# Patient Record
Sex: FEMALE | Race: BLACK OR AFRICAN AMERICAN | Employment: UNEMPLOYED | URBAN - METROPOLITAN AREA
[De-identification: names, ages, dates, MRNs, and addresses within clinical notes are randomized per-mention and may not be internally consistent; named-entity substitution may affect disease eponyms.]

---

## 2017-06-20 LAB
ANTIBODY SCREEN, EXTERNAL: NEGATIVE
CHLAMYDIA, EXTERNAL: NEGATIVE
GTT, 1 HR, GLUCOLA, EXTERNAL: 92
HBSAG, EXTERNAL: NEGATIVE
HCT, EXTERNAL: 34.8
HGB, EXTERNAL: 11.2
HIV, EXTERNAL: NEGATIVE
N. GONORRHEA, EXTERNAL: NEGATIVE
RPR, EXTERNAL: NON REACTIVE
RUBELLA, EXTERNAL: NORMAL
TYPE, ABO & RH, EXTERNAL: NORMAL

## 2017-08-01 ENCOUNTER — HOSPITAL ENCOUNTER (OUTPATIENT)
Age: 22
Setting detail: OBSERVATION
Discharge: HOME | End: 2017-08-02
Attending: OBSTETRICS & GYNECOLOGY | Admitting: OBSTETRICS & GYNECOLOGY
Payer: MEDICAID

## 2017-08-01 PROBLEM — Z34.90 PREGNANCY: Status: ACTIVE | Noted: 2017-08-01

## 2017-08-01 PROCEDURE — 99218 HC RM OBSERVATION: CPT

## 2017-08-01 PROCEDURE — 59025 FETAL NON-STRESS TEST: CPT | Performed by: OBSTETRICS & GYNECOLOGY

## 2017-08-01 PROCEDURE — 99285 EMERGENCY DEPT VISIT HI MDM: CPT | Performed by: OBSTETRICS & GYNECOLOGY

## 2017-08-01 PROCEDURE — 74011250636 HC RX REV CODE- 250/636: Performed by: OBSTETRICS & GYNECOLOGY

## 2017-08-01 PROCEDURE — 75810000275 HC EMERGENCY DEPT VISIT NO LEVEL OF CARE

## 2017-08-01 PROCEDURE — 96360 HYDRATION IV INFUSION INIT: CPT | Performed by: OBSTETRICS & GYNECOLOGY

## 2017-08-01 PROCEDURE — 74011250637 HC RX REV CODE- 250/637: Performed by: OBSTETRICS & GYNECOLOGY

## 2017-08-01 RX ORDER — SODIUM CHLORIDE, SODIUM LACTATE, POTASSIUM CHLORIDE, CALCIUM CHLORIDE 600; 310; 30; 20 MG/100ML; MG/100ML; MG/100ML; MG/100ML
500 INJECTION, SOLUTION INTRAVENOUS CONTINUOUS
Status: DISCONTINUED | OUTPATIENT
Start: 2017-08-01 | End: 2017-08-02 | Stop reason: HOSPADM

## 2017-08-01 RX ORDER — SODIUM CHLORIDE, SODIUM LACTATE, POTASSIUM CHLORIDE, CALCIUM CHLORIDE 600; 310; 30; 20 MG/100ML; MG/100ML; MG/100ML; MG/100ML
150 INJECTION, SOLUTION INTRAVENOUS CONTINUOUS
Status: DISCONTINUED | OUTPATIENT
Start: 2017-08-01 | End: 2017-08-02 | Stop reason: HOSPADM

## 2017-08-01 RX ORDER — ZOLPIDEM TARTRATE 5 MG/1
5 TABLET ORAL
Status: DISCONTINUED | OUTPATIENT
Start: 2017-08-01 | End: 2017-08-02 | Stop reason: HOSPADM

## 2017-08-01 RX ADMIN — SODIUM CHLORIDE, SODIUM LACTATE, POTASSIUM CHLORIDE, AND CALCIUM CHLORIDE 500 ML: 600; 310; 30; 20 INJECTION, SOLUTION INTRAVENOUS at 21:09

## 2017-08-01 RX ADMIN — ZOLPIDEM TARTRATE 5 MG: 5 TABLET ORAL at 23:34

## 2017-08-01 NOTE — IP AVS SNAPSHOT
Summary of Care Report The Summary of Care report has been created to help improve care coordination. Users with access to [employer] or [address] (Web-based application) may access additional patient information including the Discharge Summary. If you are not currently a [address] user and need more information, please call the number listed below in the [place] [address] section and ask to be connected with Medical Records. Facility Information Name Address Phone [address] [place] [id number] [phone] Patient Information Patient Name Sex  Verl Credit ([id number]) Female 1995 Discharge Information Admitting Provider Service Area Unit Shirin Patel MD / [phone] [address] [facility] 2 Labor & Delivery / [phone] Discharge Provider Discharge Date/Time Discharge Disposition Destination (none) 2017 (Pending) AHR (none) Patient Language Language ENGLISH [13] Hospital Problems as of 2017  Never Reviewed Class Noted - Resolved Last Modified POA Active Problems Pregnancy  2017 - Present 2017 by Shirin Patel MD Unknown Entered by Shirin Patel MD  
  
You are allergic to the following No active allergies Current Discharge Medication List  
  
CONTINUE these medications which have NOT CHANGED Dose & Instructions Dispensing Information Comments PRENATAL PO Dose:  1 Tab Take 1 Tab by mouth daily. Refills:  0 Follow-up Information Follow up With Details Comments Contact Info Shirin Patel MD Go to Regularly scheduled appointment [address] [facility] 150 1007 [facility] 
[phone] Discharge Instructions Jessica Escobedo Contractions: Care Instructions Your Care Instructions Yalaha Santana contractions prepare your uterus for labor. Think of them as a \"warm-up\" exercise that your body does. You may begin to feel them between the 28th and 30th weeks of your pregnancy. But they start as early as the 20th week. Yalaha Santana contractions usually occur more often during the ninth month. They may go away when you are active and return when you rest. These contractions are like mild contractions of true labor, but they occur less often. (You feel fewer than 8 in an hour.) They don't cause your cervix to open. It may be hard for you to tell the difference between [facility] contractions and true labor, especially in your first pregnancy. Follow-up care is a key part of your treatment and safety. Be sure to make and go to all appointments, and call your doctor if you are having problems. It's also a good idea to know your test results and keep a list of the medicines you take. How can you care for yourself at home? · Try a warm bath to help relieve muscle tension and reduce pain. · Change positions every 30 minutes. Take breaks if you must sit for a long time. Get up and walk around. · Drink plenty of water, enough so that your urine is light yellow or clear like water. · Taking short walks may help you feel better. Your doctor needs to check any contractions that are getting stronger or closer together. Where can you learn more? Go to http://dangelo-rad.info/. Enter [id number] in the search box to learn more about \"Yalaha Santana Contractions: Care Instructions. \" Current as of: March 16, 2017 Content Version: 11.3 © 1432-2616 [employer]. Care instructions adapted under license by [employer] (which disclaims liability or warranty for this information).  If you have questions about a medical condition or this instruction, always ask your healthcare professional. [employer] disclaims any warranty or liability for your use of this information. Pregnancy Precautions: Care Instructions Your Care Instructions There is no sure way to prevent labor before your due date ( labor) or to prevent most other pregnancy problems. But there are things you can do to increase your chances of a healthy pregnancy. Go to your appointments, follow your doctor's advice, and take good care of yourself. Eat well, and exercise (if your doctor agrees). And make sure to drink plenty of water. Follow-up care is a key part of your treatment and safety. Be sure to make and go to all appointments, and call your doctor if you are having problems. It's also a good idea to know your test results and keep a list of the medicines you take. How can you care for yourself at home? · Make sure you go to your prenatal appointments. At each visit, your doctor will check your blood pressure. Your doctor will also check to see if you have protein in your urine. High blood pressure and protein in urine are signs of preeclampsia. This condition can be dangerous for you and your baby. · Drink plenty of fluids, enough so that your urine is light yellow or clear like water. Dehydration can cause contractions. If you have kidney, heart, or liver disease and have to limit fluids, talk with your doctor before you increase the amount of fluids you drink. · Tell your doctor right away if you notice any symptoms of an infection, such as: ¨ Burning when you urinate. ¨ A foul-smelling discharge from your vagina. ¨ Vaginal itching. ¨ Unexplained fever. ¨ Unusual pain or soreness in your uterus or lower belly. · Eat a balanced diet. Include plenty of foods that are high in calcium and iron. ¨ Foods high in calcium include milk, cheese, yogurt, almonds, and broccoli. ¨ Foods high in iron include red meat, shellfish, poultry, eggs, beans, raisins, whole-grain bread, and leafy green vegetables. · Do not smoke. If you need help quitting, talk to your doctor about stop-smoking programs and medicines. These can increase your chances of quitting for good. · Do not drink alcohol or use illegal drugs. · Follow your doctor's directions about activity. Your doctor will let you know how much, if any, exercise you can do. · Ask your doctor if you can have sex. If you are at risk for early labor, your doctor may ask you to not have sex. · Take care to prevent falls. During pregnancy, your joints are loose, and your balance is off. Sports such as bicycling, skiing, or in-line skating can increase your risk of falling. And don't ride horses or motorcycles, dive, water ski, scuba dive, or parachute jump while you are pregnant. · Avoid getting very hot. Do not use saunas or hot tubs. Avoid staying out in the sun in hot weather for long periods. Take acetaminophen (Tylenol) to lower a high fever. · Do not take any over-the-counter or herbal medicines or supplements without talking to your doctor or pharmacist first. 
When should you call for help? Call 911 anytime you think you may need emergency care. For example, call if: 
· You passed out (lost consciousness). · You have severe vaginal bleeding. · You have severe pain in your belly or pelvis. · You have had fluid gushing or leaking from your vagina and you know or think the umbilical cord is bulging into your vagina. If this happens, immediately get down on your knees so your rear end (buttocks) is higher than your head. This will decrease the pressure on the cord until help arrives. Call your doctor now or seek immediate medical care if: 
· You have signs of preeclampsia, such as: 
¨ Sudden swelling of your face, hands, or feet. ¨ New vision problems (such as dimness or blurring). ¨ A severe headache. · You have any vaginal bleeding. · You have belly pain or cramping. · You have a fever. · You have had regular contractions (with or without pain) for an hour. This means that you have 8 or more within 1 hour or 4 or more in 20 minutes after you change your position and drink fluids. · You have a sudden release of fluid from your vagina. · You have low back pain or pelvic pressure that does not go away. · You notice that your baby has stopped moving or is moving much less than normal. 
Watch closely for changes in your health, and be sure to contact your doctor if you have any problems. Where can you learn more? Go to http://dangelo-rad.info/. Enter [id number] in the search box to learn more about \"Pregnancy Precautions: Care Instructions. \" Current as of: March 16, 2017 Content Version: 11.3 © 9387-1197 [employer]. Care instructions adapted under license by [employer] (which disclaims liability or warranty for this information). If you have questions about a medical condition or this instruction, always ask your healthcare professional. [place] [address] any warranty or liability for your use of this information. Weeks 34 to 36 of Your Pregnancy: Care Instructions Your Care Instructions By now, your baby and your belly have grown quite large. It is almost time to give birth. A full-term pregnancy can deliver between 37 and 42 weeks. Your baby's lungs are almost ready to breathe air. The bones in your baby's head are now firm enough to protect it, but soft enough to move down through the birth canal. 
You may feel excited, happy, anxious, or scared. You may wonder how you will know if you are in labor or what to expect during labor. Try to be flexible in your expectations of the birth. Because each birth is different, there is no way to know exactly what childbirth will be like for you. This care sheet will help you know what to expect and how to prepare. This may make your childbirth easier. If you haven't already had the Tdap shot during this pregnancy, talk to your doctor about getting it. It will help protect your  against pertussis infection. In the 36th week, most women have a test for group B streptococcus (GBS). GBS is a common bacteria that can live in the vagina and rectum. It can make your baby sick after birth. If you test positive, you will get antibiotics during labor. The medicine will keep your baby from getting the bacteria. Follow-up care is a key part of your treatment and safety. Be sure to make and go to all appointments, and call your doctor if you are having problems. It's also a good idea to know your test results and keep a list of the medicines you take. How can you care for yourself at home? Learn about pain relief choices · Pain is different for every woman. Talk with your doctor about your feelings about pain. · You can choose from several types of pain relief. These include medicine or breathing techniques, as well as comfort measures. You can use more than one option. · If you choose to have pain medicine during labor, talk to your doctor about your options. Some medicines lower anxiety and help with some of the pain. Others make your lower body numb so that you won't feel pain. · Be sure to tell your doctor about your pain medicine choice before you start labor or very early in your labor. You may be able to change your mind as labor progresses. · Rarely, a woman is put to sleep by medicine given through a mask or an IV. Labor and delivery · The first stage of labor has three parts: early, active, and transition. ¨ Most women have early labor at home. You can stay busy or rest, eat light snacks, drink clear fluids, and start counting contractions. ¨ When talking during a contraction gets hard, you may be moving to active labor. During active labor, you should head for the hospital if you are not there already. ¨ You are in active labor when contractions come every 3 to 4 minutes and last about 60 seconds. Your cervix is opening more rapidly. ¨ If your water breaks, contractions will come faster and stronger. ¨ During transition, your cervix is stretching, and contractions are coming more rapidly. ¨ You may want to push, but your cervix might not be ready. Your doctor will tell you when to push. · The second stage starts when your cervix is completely opened and you are ready to push. ¨ Contractions are very strong to push the baby down the birth canal. 
¨ You will feel the urge to push. You may feel like you need to have a bowel movement. ¨ You may be coached to push with contractions. These contractions will be very strong, but you will not have them as often. You can get a little rest between contractions. ¨ You may be emotional and irritable. You may not be aware of what is going on around you. ¨ One last push, and your baby is born. · The third stage is when a few more contractions push out the placenta. This may take 30 minutes or less. · The fourth stage is the welcome recovery. You may feel overwhelmed with emotions and exhausted but alert. This is a good time to start breastfeeding. Where can you learn more? Go to http://dangelo-rad.info/. Enter U196 in the search box to learn more about \"Weeks 34 to 36 of Your Pregnancy: Care Instructions. \" Current as of: March 16, 2017 Content Version: 11.3 © 5973-3004 [employer]. Care instructions adapted under license by [employer] (which disclaims liability or warranty for this information). If you have questions about a medical condition or this instruction, always ask your healthcare professional. [address] any warranty or liability for your use of this information. Counting Your Baby's Kicks: Care Instructions Your Care Instructions Counting your baby's kicks is one way your doctor can tell that your baby is healthy. Most womenespecially in a first pregnancyfeel their baby move for the first time between 16 and 22 weeks. The movement may feel like flutters rather than kicks. Your baby may move more at certain times of the day. When you are active, you may notice less kicking than when you are resting. At your prenatal visits, your doctor will ask whether the baby is active. In your last trimester, your doctor may ask you to count the number of times you feel your baby move. Follow-up care is a key part of your treatment and safety. Be sure to make and go to all appointments, and call your doctor if you are having problems. It's also a good idea to know your test results and keep a list of the medicines you take. How do you count fetal kicks? · A common method of checking your baby's movement is to count the number of kicks or moves you feel in 1 hour. Ten movements (such as kicks, flutters, or rolls) in 1 hour are normal. Some doctors suggest that you count in the morning until you get to 10 movements. Then you can quit for that day and start again the next day. · Pick your baby's most active time of day to count. This may be any time from morning to evening. · If you do not feel 10 movements in an hour, your baby may be sleeping. Wait for the next hour and count again. When should you call for help? Call your doctor now or seek immediate medical care if: 
· You noticed that your baby has stopped moving or is moving much less than normal. 
Watch closely for changes in your health, and be sure to contact your doctor if you have any problems. Where can you learn more? Go to http://dangelo-rad.info/. Enter M456 in the search box to learn more about \"Counting Your Baby's Kicks: Care Instructions. \" Current as of: March 16, 2017 Content Version: 11.3 © 3023-9015 [employer]. Care instructions adapted under license by [email or web address] (which disclaims liability or warranty for this information). If you have questions about a medical condition or this instruction, always ask your healthcare professional. [place] [address] any warranty or liability for your use of this information. Chart Review Routing History No Routing History on File

## 2017-08-02 VITALS
RESPIRATION RATE: 14 BRPM | HEART RATE: 85 BPM | HEIGHT: 68 IN | DIASTOLIC BLOOD PRESSURE: 56 MMHG | SYSTOLIC BLOOD PRESSURE: 117 MMHG | BODY MASS INDEX: 31.22 KG/M2 | WEIGHT: 206 LBS | TEMPERATURE: 97.9 F

## 2017-08-02 PROCEDURE — 99218 HC RM OBSERVATION: CPT

## 2017-08-02 NOTE — PROGRESS NOTES
Pt arrived to L&D with complaint of contractions. Pt rates contractions 3/10 on pain scale. Pt states she had sexual intercourse this evening and had approximately 4 cups of water today. 2045 Spoke to Dr. Valentino Cota, updated on pt status, SVE, contractions, and that pt had sex and has not had very much water to drink today. Per MD, will give 500mL bolus and recheck in a few hours if contractions persist and update MD. If contractions stop no need to recheck per MD.   2300 Dr. Valentino Cota updated on pt. Will keep pt overnight. May have ambien if she wants. Pt to stay on monitor. Bedside shift change report given to [facility], RN (oncoming nurse) by NATHANIEL Vieira RN (offgoing nurse). Report included the following information SBAR, Kardex, Intake/Output, MAR, Recent Results and Med Rec Status.

## 2017-08-02 NOTE — DISCHARGE INSTRUCTIONS
Boris Noun Contractions: Care Instructions  Your Care Instructions  Panda Santana contractions prepare your uterus for labor. Think of them as a \"warm-up\" exercise that your body does. You may begin to feel them between the 28th and 30th weeks of your pregnancy. But they start as early as the 20th week. [place] Santana contractions usually occur more often during the ninth month. They may go away when you are active and return when you rest. These contractions are like mild contractions of true labor, but they occur less often. (You feel fewer than 8 in an hour.) They don't cause your cervix to open. It may be hard for you to tell the difference between Boris Noun contractions and true labor, especially in your first pregnancy. Follow-up care is a key part of your treatment and safety. Be sure to make and go to all appointments, and call your doctor if you are having problems. It's also a good idea to know your test results and keep a list of the medicines you take. How can you care for yourself at home? · Try a warm bath to help relieve muscle tension and reduce pain. · Change positions every 30 minutes. Take breaks if you must sit for a long time. Get up and walk around. · Drink plenty of water, enough so that your urine is light yellow or clear like water. · Taking short walks may help you feel better. Your doctor needs to check any contractions that are getting stronger or closer together. Where can you learn more? Go to http://dangelo-rad.info/. Enter [id number] in the search box to learn more about \"[place] Santana Contractions: Care Instructions. \"  Current as of: March 16, 2017  Content Version: 11.3  © 9426-2843 [employer]. Care instructions adapted under license by [employer] (which disclaims liability or warranty for this information).  If you have questions about a medical condition or this instruction, always ask your healthcare professional. [employer], [facility] disclaims any warranty or liability for your use of this information. Pregnancy Precautions: Care Instructions  Your Care Instructions  There is no sure way to prevent labor before your due date ( labor) or to prevent most other pregnancy problems. But there are things you can do to increase your chances of a healthy pregnancy. Go to your appointments, follow your doctor's advice, and take good care of yourself. Eat well, and exercise (if your doctor agrees). And make sure to drink plenty of water. Follow-up care is a key part of your treatment and safety. Be sure to make and go to all appointments, and call your doctor if you are having problems. It's also a good idea to know your test results and keep a list of the medicines you take. How can you care for yourself at home? · Make sure you go to your prenatal appointments. At each visit, your doctor will check your blood pressure. Your doctor will also check to see if you have protein in your urine. High blood pressure and protein in urine are signs of preeclampsia. This condition can be dangerous for you and your baby. · Drink plenty of fluids, enough so that your urine is light yellow or clear like water. Dehydration can cause contractions. If you have kidney, heart, or liver disease and have to limit fluids, talk with your doctor before you increase the amount of fluids you drink. · Tell your doctor right away if you notice any symptoms of an infection, such as:  ¨ Burning when you urinate. ¨ A foul-smelling discharge from your vagina. ¨ Vaginal itching. ¨ Unexplained fever. ¨ Unusual pain or soreness in your uterus or lower belly. · Eat a balanced diet. Include plenty of foods that are high in calcium and iron. ¨ Foods high in calcium include milk, cheese, yogurt, almonds, and broccoli.   ¨ Foods high in iron include red meat, shellfish, poultry, eggs, beans, raisins, whole-grain bread, and leafy green vegetables. · Do not smoke. If you need help quitting, talk to your doctor about stop-smoking programs and medicines. These can increase your chances of quitting for good. · Do not drink alcohol or use illegal drugs. · Follow your doctor's directions about activity. Your doctor will let you know how much, if any, exercise you can do. · Ask your doctor if you can have sex. If you are at risk for early labor, your doctor may ask you to not have sex. · Take care to prevent falls. During pregnancy, your joints are loose, and your balance is off. Sports such as bicycling, skiing, or in-line skating can increase your risk of falling. And don't ride horses or motorcycles, dive, water ski, scuba dive, or parachute jump while you are pregnant. · Avoid getting very hot. Do not use saunas or hot tubs. Avoid staying out in the sun in hot weather for long periods. Take acetaminophen (Tylenol) to lower a high fever. · Do not take any over-the-counter or herbal medicines or supplements without talking to your doctor or pharmacist first.  When should you call for help? Call 911 anytime you think you may need emergency care. For example, call if:  · You passed out (lost consciousness). · You have severe vaginal bleeding. · You have severe pain in your belly or pelvis. · You have had fluid gushing or leaking from your vagina and you know or think the umbilical cord is bulging into your vagina. If this happens, immediately get down on your knees so your rear end (buttocks) is higher than your head. This will decrease the pressure on the cord until help arrives. Call your doctor now or seek immediate medical care if:  · You have signs of preeclampsia, such as:  ¨ Sudden swelling of your face, hands, or feet. ¨ New vision problems (such as dimness or blurring). ¨ A severe headache. · You have any vaginal bleeding. · You have belly pain or cramping. · You have a fever.   · You have had regular contractions (with or without pain) for an hour. This means that you have 8 or more within 1 hour or 4 or more in 20 minutes after you change your position and drink fluids. · You have a sudden release of fluid from your vagina. · You have low back pain or pelvic pressure that does not go away. · You notice that your baby has stopped moving or is moving much less than normal.  Watch closely for changes in your health, and be sure to contact your doctor if you have any problems. Where can you learn more? Go to http://dangelo-rad.info/. Enter [id number] in the search box to learn more about \"Pregnancy Precautions: Care Instructions. \"  Current as of: March 16, 2017  Content Version: 11.3  © 0025-9843 [employer]. Care instructions adapted under license by [employer] (which disclaims liability or warranty for this information). If you have questions about a medical condition or this instruction, always ask your healthcare professional. [place] [address] any warranty or liability for your use of this information. Weeks 34 to 36 of Your Pregnancy: Care Instructions  Your Care Instructions    By now, your baby and your belly have grown quite large. It is almost time to give birth. A full-term pregnancy can deliver between 37 and 42 weeks. Your baby's lungs are almost ready to breathe air. The bones in your baby's head are now firm enough to protect it, but soft enough to move down through the birth canal.  You may feel excited, happy, anxious, or scared. You may wonder how you will know if you are in labor or what to expect during labor. Try to be flexible in your expectations of the birth. Because each birth is different, there is no way to know exactly what childbirth will be like for you. This care sheet will help you know what to expect and how to prepare. This may make your childbirth easier.   If you haven't already had the Tdap shot during this pregnancy, talk to your doctor about getting it. It will help protect your  against pertussis infection. In the 36th week, most women have a test for group B streptococcus (GBS). GBS is a common bacteria that can live in the vagina and rectum. It can make your baby sick after birth. If you test positive, you will get antibiotics during labor. The medicine will keep your baby from getting the bacteria. Follow-up care is a key part of your treatment and safety. Be sure to make and go to all appointments, and call your doctor if you are having problems. It's also a good idea to know your test results and keep a list of the medicines you take. How can you care for yourself at home? Learn about pain relief choices  · Pain is different for every woman. Talk with your doctor about your feelings about pain. · You can choose from several types of pain relief. These include medicine or breathing techniques, as well as comfort measures. You can use more than one option. · If you choose to have pain medicine during labor, talk to your doctor about your options. Some medicines lower anxiety and help with some of the pain. Others make your lower body numb so that you won't feel pain. · Be sure to tell your doctor about your pain medicine choice before you start labor or very early in your labor. You may be able to change your mind as labor progresses. · Rarely, a woman is put to sleep by medicine given through a mask or an IV. Labor and delivery  · The first stage of labor has three parts: early, active, and transition. ¨ Most women have early labor at home. You can stay busy or rest, eat light snacks, drink clear fluids, and start counting contractions. ¨ When talking during a contraction gets hard, you may be moving to active labor. During active labor, you should head for the hospital if you are not there already. ¨ You are in active labor when contractions come every 3 to 4 minutes and last about 60 seconds.  Your cervix is opening more rapidly. ¨ If your water breaks, contractions will come faster and stronger. ¨ During transition, your cervix is stretching, and contractions are coming more rapidly. ¨ You may want to push, but your cervix might not be ready. Your doctor will tell you when to push. · The second stage starts when your cervix is completely opened and you are ready to push. ¨ Contractions are very strong to push the baby down the birth canal.  ¨ You will feel the urge to push. You may feel like you need to have a bowel movement. ¨ You may be coached to push with contractions. These contractions will be very strong, but you will not have them as often. You can get a little rest between contractions. ¨ You may be emotional and irritable. You may not be aware of what is going on around you. ¨ One last push, and your baby is born. · The third stage is when a few more contractions push out the placenta. This may take 30 minutes or less. · The fourth stage is the welcome recovery. You may feel overwhelmed with emotions and exhausted but alert. This is a good time to start breastfeeding. Where can you learn more? Go to http://dangelo-rad.info/. Enter X648 in the search box to learn more about \"Weeks 34 to 36 of Your Pregnancy: Care Instructions. \"  Current as of: March 16, 2017  Content Version: 11.3  © 3339-0617 [employer]. Care instructions adapted under license by [employer] (which disclaims liability or warranty for this information). If you have questions about a medical condition or this instruction, always ask your healthcare professional. [place] [address] any warranty or liability for your use of this information. Counting Your Baby's Kicks: Care Instructions  Your Care Instructions  Counting your baby's kicks is one way your doctor can tell that your baby is healthy.  Most women--especially in a first pregnancy--feel their baby move for the first time between 16 and 22 weeks. The movement may feel like flutters rather than kicks. Your baby may move more at certain times of the day. When you are active, you may notice less kicking than when you are resting. At your prenatal visits, your doctor will ask whether the baby is active. In your last trimester, your doctor may ask you to count the number of times you feel your baby move. Follow-up care is a key part of your treatment and safety. Be sure to make and go to all appointments, and call your doctor if you are having problems. It's also a good idea to know your test results and keep a list of the medicines you take. How do you count fetal kicks? · A common method of checking your baby's movement is to count the number of kicks or moves you feel in 1 hour. Ten movements (such as kicks, flutters, or rolls) in 1 hour are normal. Some doctors suggest that you count in the morning until you get to 10 movements. Then you can quit for that day and start again the next day. · Pick your baby's most active time of day to count. This may be any time from morning to evening. · If you do not feel 10 movements in an hour, your baby may be sleeping. Wait for the next hour and count again. When should you call for help? Call your doctor now or seek immediate medical care if:  · You noticed that your baby has stopped moving or is moving much less than normal.  Watch closely for changes in your health, and be sure to contact your doctor if you have any problems. Where can you learn more? Go to http://dangelo-rad.info/. Enter R378 in the search box to learn more about \"Counting Your Baby's Kicks: Care Instructions. \"  Current as of: March 16, 2017  Content Version: 11.3  © 4465-6384 [employer]. Care instructions adapted under license by [email or web address] (which disclaims liability or warranty for this information).  If you have questions about a medical condition or this instruction, always ask your healthcare professional. [place] [address] any warranty or liability for your use of this information.

## 2017-08-02 NOTE — PROGRESS NOTES
26 - Assumed care of patient at this time. Patient sleeping.     2118 - Patient sleeping.     TravisFarzanehBrant - Dr. Zulema Mancuso at bedside assessing patient. SVE = unchanged from previous exam. Verbal orders to discharge patient this morning. Dr. Zulema Mancuso reviewed FHR tracing at this time, can DC EFM. VSS. Patient denies contractions. 0820 - PIV removed. Tip intact. I have reviewed discharge instructions with the patient including week 35-36 pregnancy, kick counts, pregnancy precautions, follow-up instructions and yanet carnes contractions. The patient verbalized understanding and questioned true labor vs yanet carnes, and kick counts. Educated on both subjects and importance of staying hydrated on hot summer days. Patient had no further questions at this time. 9194 - Patient ambulated off unit at this time with discharge instructions and personal belongings.

## 2017-08-02 NOTE — PROGRESS NOTES
25 yo A1 with IUP at 35 3/7 weeks presented last night with frequent cramping after intercourse yesterday afternoon. No bleeding or LOF. Reported good FM as well. On arrival to L&D she was noted to be sangeeta frequently but not much discomfort. Her cervix was 1/50%. After monitoring on L&D UCs continued and there was thought to be some minimal cervical change. She was then monitored overnight. This morning she is without complaints. Not feeling any UCs. +FM. No VB, LOF    Exam shows VSS AF  Cervix 1/50/-3  EFM shows reactive tracing. No UCs noted at present    IMP: Threatened  labor  Plan: discharge to home, f/u as scheduled. Pelvic rest for now.

## 2017-08-10 LAB — GRBS, EXTERNAL: NEGATIVE

## 2017-09-05 ENCOUNTER — ANESTHESIA EVENT (OUTPATIENT)
Dept: LABOR AND DELIVERY | Age: 22
DRG: 560 | End: 2017-09-05
Payer: MEDICAID

## 2017-09-05 ENCOUNTER — HOSPITAL ENCOUNTER (INPATIENT)
Age: 22
LOS: 2 days | Discharge: HOME | DRG: 560 | End: 2017-09-07
Attending: OBSTETRICS & GYNECOLOGY | Admitting: OBSTETRICS & GYNECOLOGY
Payer: MEDICAID

## 2017-09-05 ENCOUNTER — ANESTHESIA (OUTPATIENT)
Dept: LABOR AND DELIVERY | Age: 22
DRG: 560 | End: 2017-09-05
Payer: MEDICAID

## 2017-09-05 LAB
ERYTHROCYTE [DISTWIDTH] IN BLOOD BY AUTOMATED COUNT: 14.6 % (ref 11.5–14.5)
HCT VFR BLD AUTO: 35.8 % (ref 35–47)
HGB BLD-MCNC: 11 G/DL (ref 11.5–16)
MCH RBC QN AUTO: 25.2 PG (ref 26–34)
MCHC RBC AUTO-ENTMCNC: 30.7 G/DL (ref 30–36.5)
MCV RBC AUTO: 81.9 FL (ref 80–99)
PLATELET # BLD AUTO: 255 K/UL (ref 150–400)
RBC # BLD AUTO: 4.37 M/UL (ref 3.8–5.2)
WBC # BLD AUTO: 10 K/UL (ref 3.6–11)

## 2017-09-05 PROCEDURE — 74011000250 HC RX REV CODE- 250

## 2017-09-05 PROCEDURE — 74011250636 HC RX REV CODE- 250/636: Performed by: OBSTETRICS & GYNECOLOGY

## 2017-09-05 PROCEDURE — 65270000029 HC RM PRIVATE

## 2017-09-05 PROCEDURE — 74011250637 HC RX REV CODE- 250/637: Performed by: OBSTETRICS & GYNECOLOGY

## 2017-09-05 PROCEDURE — 77030014125 HC TY EPDRL BBMI -B: Performed by: ANESTHESIOLOGY

## 2017-09-05 PROCEDURE — 74011250636 HC RX REV CODE- 250/636: Performed by: ANESTHESIOLOGY

## 2017-09-05 PROCEDURE — 76060000078 HC EPIDURAL ANESTHESIA: Performed by: ANESTHESIOLOGY

## 2017-09-05 PROCEDURE — 74011250636 HC RX REV CODE- 250/636

## 2017-09-05 PROCEDURE — 77030034850

## 2017-09-05 PROCEDURE — 75410000000 HC DELIVERY VAGINAL/SINGLE: Performed by: OBSTETRICS & GYNECOLOGY

## 2017-09-05 PROCEDURE — 3E033VJ INTRODUCTION OF OTHER HORMONE INTO PERIPHERAL VEIN, PERCUTANEOUS APPROACH: ICD-10-PCS | Performed by: OBSTETRICS & GYNECOLOGY

## 2017-09-05 PROCEDURE — 77030018749 HC HK AMNIO DISP DERY -A

## 2017-09-05 PROCEDURE — 75410000002 HC LABOR FEE PER 1 HR: Performed by: OBSTETRICS & GYNECOLOGY

## 2017-09-05 PROCEDURE — 75410000003 HC RECOV DEL/VAG/CSECN EA 0.5 HR: Performed by: OBSTETRICS & GYNECOLOGY

## 2017-09-05 PROCEDURE — 36415 COLL VENOUS BLD VENIPUNCTURE: CPT | Performed by: OBSTETRICS & GYNECOLOGY

## 2017-09-05 PROCEDURE — 85027 COMPLETE CBC AUTOMATED: CPT | Performed by: OBSTETRICS & GYNECOLOGY

## 2017-09-05 PROCEDURE — 00HU33Z INSERTION OF INFUSION DEVICE INTO SPINAL CANAL, PERCUTANEOUS APPROACH: ICD-10-PCS | Performed by: ANESTHESIOLOGY

## 2017-09-05 RX ORDER — HYDROCODONE BITARTRATE AND ACETAMINOPHEN 5; 325 MG/1; MG/1
1 TABLET ORAL
Status: DISCONTINUED | OUTPATIENT
Start: 2017-09-05 | End: 2017-09-07 | Stop reason: HOSPADM

## 2017-09-05 RX ORDER — HYDROCODONE BITARTRATE AND ACETAMINOPHEN 5; 325 MG/1; MG/1
2 TABLET ORAL
Status: DISCONTINUED | OUTPATIENT
Start: 2017-09-05 | End: 2017-09-07 | Stop reason: HOSPADM

## 2017-09-05 RX ORDER — ONDANSETRON 4 MG/1
4 TABLET, ORALLY DISINTEGRATING ORAL
Status: DISCONTINUED | OUTPATIENT
Start: 2017-09-05 | End: 2017-09-07 | Stop reason: HOSPADM

## 2017-09-05 RX ORDER — SODIUM CHLORIDE, SODIUM LACTATE, POTASSIUM CHLORIDE, CALCIUM CHLORIDE 600; 310; 30; 20 MG/100ML; MG/100ML; MG/100ML; MG/100ML
125 INJECTION, SOLUTION INTRAVENOUS CONTINUOUS
Status: DISCONTINUED | OUTPATIENT
Start: 2017-09-05 | End: 2017-09-07 | Stop reason: HOSPADM

## 2017-09-05 RX ORDER — IBUPROFEN 800 MG/1
800 TABLET ORAL
Status: DISCONTINUED | OUTPATIENT
Start: 2017-09-05 | End: 2017-09-07 | Stop reason: HOSPADM

## 2017-09-05 RX ORDER — SODIUM CHLORIDE 0.9 % (FLUSH) 0.9 %
5-10 SYRINGE (ML) INJECTION AS NEEDED
Status: DISCONTINUED | OUTPATIENT
Start: 2017-09-05 | End: 2017-09-07 | Stop reason: HOSPADM

## 2017-09-05 RX ORDER — FENTANYL/BUPIVACAINE/NS/PF 2-1250MCG
1-16 PREFILLED PUMP RESERVOIR EPIDURAL CONTINUOUS
Status: DISCONTINUED | OUTPATIENT
Start: 2017-09-05 | End: 2017-09-07 | Stop reason: HOSPADM

## 2017-09-05 RX ORDER — OXYTOCIN/RINGER'S LACTATE 20/1000 ML
125-500 PLASTIC BAG, INJECTION (ML) INTRAVENOUS ONCE
Status: ACTIVE | OUTPATIENT
Start: 2017-09-05 | End: 2017-09-06

## 2017-09-05 RX ORDER — HYDROCORTISONE ACETATE PRAMOXINE HCL 2.5; 1 G/100G; G/100G
CREAM TOPICAL AS NEEDED
Status: DISCONTINUED | OUTPATIENT
Start: 2017-09-05 | End: 2017-09-07 | Stop reason: HOSPADM

## 2017-09-05 RX ORDER — ACETAMINOPHEN 325 MG/1
650 TABLET ORAL
Status: DISCONTINUED | OUTPATIENT
Start: 2017-09-05 | End: 2017-09-07 | Stop reason: HOSPADM

## 2017-09-05 RX ORDER — BUPIVACAINE HYDROCHLORIDE 2.5 MG/ML
INJECTION, SOLUTION EPIDURAL; INFILTRATION; INTRACAUDAL AS NEEDED
Status: DISCONTINUED | OUTPATIENT
Start: 2017-09-05 | End: 2017-09-05 | Stop reason: HOSPADM

## 2017-09-05 RX ORDER — OXYTOCIN IN 5 % DEXTROSE 30/500 ML
PLASTIC BAG, INJECTION (ML) INTRAVENOUS
Status: COMPLETED
Start: 2017-09-05 | End: 2017-09-05

## 2017-09-05 RX ORDER — DIPHENHYDRAMINE HCL 25 MG
25 CAPSULE ORAL
Status: DISCONTINUED | OUTPATIENT
Start: 2017-09-05 | End: 2017-09-07 | Stop reason: HOSPADM

## 2017-09-05 RX ORDER — MINERAL OIL
120 OIL (ML) ORAL ONCE
Status: COMPLETED | OUTPATIENT
Start: 2017-09-05 | End: 2017-09-05

## 2017-09-05 RX ORDER — OXYTOCIN IN 5 % DEXTROSE 30/500 ML
.5-42 PLASTIC BAG, INJECTION (ML) INTRAVENOUS
Status: DISCONTINUED | OUTPATIENT
Start: 2017-09-05 | End: 2017-09-07 | Stop reason: HOSPADM

## 2017-09-05 RX ORDER — NALOXONE HYDROCHLORIDE 0.4 MG/ML
0.4 INJECTION, SOLUTION INTRAMUSCULAR; INTRAVENOUS; SUBCUTANEOUS ONCE
Status: ACTIVE | OUTPATIENT
Start: 2017-09-05 | End: 2017-09-06

## 2017-09-05 RX ORDER — NALOXONE HYDROCHLORIDE 0.4 MG/ML
0.4 INJECTION, SOLUTION INTRAMUSCULAR; INTRAVENOUS; SUBCUTANEOUS AS NEEDED
Status: DISCONTINUED | OUTPATIENT
Start: 2017-09-05 | End: 2017-09-07 | Stop reason: HOSPADM

## 2017-09-05 RX ORDER — NALOXONE HYDROCHLORIDE 0.4 MG/ML
0.4 INJECTION, SOLUTION INTRAMUSCULAR; INTRAVENOUS; SUBCUTANEOUS AS NEEDED
Status: DISCONTINUED | OUTPATIENT
Start: 2017-09-05 | End: 2017-09-05 | Stop reason: SDUPTHER

## 2017-09-05 RX ORDER — SODIUM CHLORIDE 0.9 % (FLUSH) 0.9 %
5-10 SYRINGE (ML) INJECTION EVERY 8 HOURS
Status: DISCONTINUED | OUTPATIENT
Start: 2017-09-05 | End: 2017-09-07 | Stop reason: HOSPADM

## 2017-09-05 RX ADMIN — Medication 2 MILLI-UNITS/MIN: at 10:58

## 2017-09-05 RX ADMIN — SODIUM CHLORIDE, SODIUM LACTATE, POTASSIUM CHLORIDE, AND CALCIUM CHLORIDE 1000 ML: 600; 310; 30; 20 INJECTION, SOLUTION INTRAVENOUS at 13:31

## 2017-09-05 RX ADMIN — SODIUM CHLORIDE, SODIUM LACTATE, POTASSIUM CHLORIDE, AND CALCIUM CHLORIDE 125 ML/HR: 600; 310; 30; 20 INJECTION, SOLUTION INTRAVENOUS at 10:40

## 2017-09-05 RX ADMIN — FENTANYL 0.2 MG/100ML-BUPIV 0.125%-NACL 0.9% EPIDURAL INJ 12 ML/HR: 2/0.125 SOLUTION at 14:06

## 2017-09-05 RX ADMIN — Medication 999 MILLI-UNITS/MIN: at 17:41

## 2017-09-05 RX ADMIN — BUPIVACAINE HYDROCHLORIDE 10 ML: 2.5 INJECTION, SOLUTION EPIDURAL; INFILTRATION; INTRACAUDAL at 14:10

## 2017-09-05 RX ADMIN — MINERAL OIL 60 ML: 99.9 LIQUID ORAL; TOPICAL at 17:30

## 2017-09-05 RX ADMIN — IBUPROFEN 800 MG: 800 TABLET, FILM COATED ORAL at 19:17

## 2017-09-05 NOTE — DISCHARGE SUMMARY
Obstetrical Discharge Summary     Name: Hailey Gomez MRN: [id number]  SSN: [id number]    YOB: 1995  Age: 24 y.o. Sex: female      Admit Date: 2017    Discharge Date: 2017    Admitting Physician: Xiang Lopez MD     Attending Physician:  Xiang Lopez MD     * Admission Diagnoses: Pregnancy  Pregnancy    * Discharge Diagnoses:   Information for the patient's :  Yun Greer, Male [[id number]]   Delivery of a   male infant via Vaginal, Spontaneous Delivery on 2017 at 10:29 PM  by . Apgars were 8 and . Additional Diagnoses:   Hospital Problems as of 2017  Never Reviewed          Codes Class Noted - Resolved POA    Pregnancy ICD-10-CM: Z33.1  ICD-9-CM: V22.2  2017 - Present Unknown             Lab Results   Component Value Date/Time    Rubella, External Immune 2017    GrBStrep, External negative 08/10/2017    ABO,Rh A negative 2017      There is no immunization history on file for this patient. * Procedures: term   * No surgery found *           * Discharge Condition: good    [facility] Course: Normal hospital course following the delivery. * Disposition: Home    Discharge Medications:   Current Discharge Medication List          * Follow-up Care/Patient Instructions:   Activity: No sex for 6 weeks and No heavy lifting for 6 weeks  Diet: Regular Diet  Wound Care: As directed    Follow-up Information     None           Signed By:  Xiang Lopez MD     2017

## 2017-09-05 NOTE — PROGRESS NOTES
9/5/2017  1015 am  Pt arrived for elective induction. 1046 am  Dr Anibal Soler called and stated to start pitocin at this time. Will be on unit around lunch time to arom pt.   1300 pm  Dr Anibal Soler in room, reviewed fhr tracing at this time. SVE 4/60-70/-1, AROM at this time, clear, moderate amt of fluid noted, MD stated pt may get epidural if she wants. 1450pm   Dr Anibal Soler at nurses station, RN informed that pt pitocin was turned off due to sangeeta every 3 minutes and in a lot of pain. MD aware, and stated to check pt in 1 hour and if her cervix has not changed to restart pitocin at that time. 1655 pm   RN spoke to Dr Anibal Soler and informed MD that pt is feeling mild pressure that is tolerable, MD stated he will be on unit in 15-20 minutes MD stated if RN needs to check pt sooner that she can.  1855 pm  SBAR report given to Albert Loyola RN, assumed care of pt at this time.

## 2017-09-05 NOTE — IP AVS SNAPSHOT
[address] [place] [address] 
[phone] Patient: Harshad Zhao MRN: [id number] :1995 You are allergic to the following No active allergies Immunizations Administered for This Admission Name Date Rho(D) Immune Globulin - IM 2017 Recent Documentation Height Weight Breastfeeding? BMI OB Status Smoking Status 1.727 m 93.4 kg Unknown 31.32 kg/m2 Recent pregnancy Never Smoker Unresulted Labs Order Current Status SAMPLE TO BLOOD BANK In process Emergency Contacts Name Discharge Info Relation Home Work Mobile Heri Barber DISCHARGE CAREGIVER [3] Other Relative [6] [phone] [5] [phone] About your hospitalization You were admitted on:  2017 You last received care in the:  [facility] You were discharged on:  2017 Unit phone number:  [phone] Why you were hospitalized Your primary diagnosis was:  Not on File Your diagnoses also included:  Pregnancy Providers Seen During Your Hospitalizations Provider Role Specialty Primary office phone Irlanda Winston MD Attending Provider Obstetrics & Gynecology [phone] Your Primary Care Physician (PCP) Primary Care Physician Office Phone Office Fax Catherine Alonso [phone] Follow-up Information Follow up With Details Comments Contact Info Ariella Calderon MD   [address] [address] [address] [place] [address] 
[phone] Current Discharge Medication List  
  
CONTINUE these medications which have NOT CHANGED Dose & Instructions Dispensing Information Comments Morning Noon Evening Bedtime PRENATAL PO Your last dose was: Your next dose is:    
   
   
 Dose:  1 Tab Take 1 Tab by mouth daily. Refills:  0 Discharge Instructions Discharge Instructions for  Section Patient ID: Lucita Blackman 
[id number] 
21 y.o. 
1995 Continue taking your prenatal vitamins if you are breastfeeding. Follow-up care is a key part of your treatment and safety. Be sure to keep all your scheduled appointments Activity 1. Avoid heavy lifting greater than 10lbs for 2 weeks after your surgery 2. Pelvic rest for 6 weeks, ie, nothing in your vagina for 6 weeks  (no intercourse, tampons, or douching). No tubs for 6 weeks. 3. No driving for 2 weeks and until you are no longer taking prescription pain medications and you can put your foot on the break in a hurry 4. Limit climbing stairs to only when necessary the first 1-2 weeks. Hold the railing and do not carry your baby up and downstairs at first for safety 5. You may walk as tolerated, though be care to not over-do it. Walking will assist in overall healing, decrease constipation and bloating. Kamini Schneiders feel better more quickly with some daily movement. Diet Regular diet as tolerated Wound care There are several types of incision closures. 1. If you have metal staples in place when you leave the hospital, please call your doctors office to schedule the staple removal as directed at discharge. 2. If you have steri strips covering your incision, you may remove them as they fall off or be sure to remove them about one week after your surgery. Removing them in the shower may be easier. 3. If you have Dermabond, or skin glue, covering your incision, it will fall off slowly in the next few weeks. You may remove it as it begins to peel off. Additional wound care 1. Clear or reddish drainage from your incision is normal.  It's best to leave it open to the air, but if there is drainage, you may cover the incision lightly with gauze, preferably without tape. 2.  Keep the incision clean and dry. 3. Numbness of the skin at or around your incision is normal and the feeling usually returns gradually. 4. Call your doctor if you have increasing drainage from your incision, an unpleasant odor, red streaks, an increase in pain, or if it appears to open. Pain Management 1. Continue prescription pain medication as written by discharging physician 2. Over the counter medications such as Tylenol and ibuprofen (Motrin or Advil) are also ideal.  These may be taken together or in alternating doses. You may  take the maximum dose:  Motrin or Advil (generic ibuprofen), either 3 tablets every 6 hours or 4 tablets every 8 hours. Your prescription medication conntains a narcotic mixed with Tylenol,so  you should not take any extra Tylenol or acetaminophen until you have reduced your prescription pain medication. The maximum dose is Tylenol or acetominophen 1000 mg every 6 hours (equivalent to 2 Extra Strength Tylenols every 6 hours). 3. After a few days, begin to replace the prescription medication with over the counter medications. Use the prescription medication if needed for more severe pain or at night. The prescription medication can be addictive if overused. 4. Add heating pad or sitz baths as needed. Constipation 1. Constipation is normal after surgery, especially while taking prescription narcotic pain medication. 2. Over the counter remedies including ducosate (Colace), take 1-2 capsules 1-2 times daily for soft stool as needed. You may also add/ try milk of magnesia or rectal remedies such as Dulcolax or Fleets enema. Recovery: What to Expect at [facility] 1. Fatigue is expected. Try to rest when you can and don't worry about doing housework or other tasks which can wait. 2. The soreness in your incision will improve significantly over the first 2 weeks, but it may take 6-8 weeks before you are completely recovered.  
3. Back pain or general body aches or muscle soreness are expected and should improve with acetominophen or ibuprofen. 4. Leg swelling due to pregnancy and/or IV fluids given in the hospital will take about two weeks to resolve. 5. Most women experience some form of the \"Baby Blues\" after having a baby. Feeling emotional, tearful, frustrated, anxious, sad, and irritable some of the time is normal and go away after about 2 weeks. Adequate rest and help from your family will help. Take breaks from caring for the baby. Call your doctor if your symptoms seem severe, last more than 2 weeks, or seem to be getting worse instead of better. Get help immediately if you have thoughts of wanting to hurt yourself or others! Call your doctor or seek immediate medical care if you have: 
Heavy vaginal bleeding, soaking through one or more pads an hour for several hours. Foul-smelling discharge from your vagina or incision. Consistent nausea and vomiting and cannot keep fluids down. Consistent pain that does not get better after you take pain medicine. Sudden chest pain and shortness of breath Signs of a blood clot: pain/ swelling/ increasing redness in your lower extremeties Signs of infection: increased pain in your abdomen or vaginal area; red streaks, warmth, or tenderness of your breasts; fever of 100.5 F or greater Discharge Orders None [facility] Announcement We are excited to announce that we are making your provider's discharge notes available to you in [employer]. You will see these notes when they are completed and signed by the physician that discharged you from your recent hospital stay. If you have any questions or concerns about any information you see in [employer], please call the Health Information Department where you were seen or reach out to your Primary Care Provider for more information about your plan of care. Introducing [facility]!    
 [facility] introduces [employer]Me patient portal. Now you can access parts of your medical record, email your doctor's office, and request medication refills online. 1. In your internet browser, go to [email or web address] 2. Click on the First Time User? Click Here link in the Sign In box. You will see the New Member Sign Up page. 3. Enter your [employer] Access Code exactly as it appears below. You will not need to use this code after youve completed the sign-up process. If you do not sign up before the expiration date, you must request a new code. · [employer] Access Code: [email or web address] Expires: 10/31/2017  8:18 AM 
 
4. Enter the last four digits of your Social Security Number (xxxx) and Date of Birth (mm/dd/yyyy) as indicated and click Submit. You will be taken to the next sign-up page. 5. Create a [employer] ID. This will be your [employer] login ID and cannot be changed, so think of one that is secure and easy to remember. 6. Create a [employer] password. You can change your password at any time. 7. Enter your Password Reset Question and Answer. This can be used at a later time if you forget your password. 8. Enter your e-mail address. You will receive e-mail notification when new information is available in [address] 9. Click Sign Up. You can now view and download portions of your medical record. 10. Click the Download Summary menu link to download a portable copy of your medical information. If you have questions, please visit the Frequently Asked Questions section of the [employer] website. Remember, [employer] is NOT to be used for urgent needs. For medical emergencies, dial 911. Now available from your iPhone and Android! General Information Please provide this summary of care documentation to your next provider. Patient Signature:  ____________________________________________________________ Date:  ____________________________________________________________  
  
Jocelyne Rajendra  Provider Signature: ____________________________________________________________ Date:  ____________________________________________________________

## 2017-09-05 NOTE — IP AVS SNAPSHOT
[address] [id number] Patient: Patrick Dahl MRN: [id number] :1995 Current Discharge Medication List  
  
CONTINUE these medications which have NOT CHANGED Dose & Instructions Dispensing Information Comments Morning Noon Evening Bedtime PRENATAL PO Your last dose was: Your next dose is:    
   
   
 Dose:  1 Tab Take 1 Tab by mouth daily. Refills:  0

## 2017-09-05 NOTE — H&P
History & Physical    Name: Patrick Dahl MRN: [id number]  SSN: [id number]    YOB: 1995  Age: 24 y.o. Sex: female        Subjective:     Estimated Date of Delivery: 9/3/17  OB History    Para Term  AB Living   3 1 1  1    SAB TAB Ectopic Molar Multiple Live Births   1           # Outcome Date GA Lbr Blake/2nd Weight Sex Delivery Anes PTL Lv   3 Current            2 Term            1 SAB                   Ms. Kalyani Staley is admitted with pregnancy at 40w2d for induction of labor. Prenatal course basically normal. Late PNC. Good FM, no VB, LOF. Cervix favorable. Please see prenatal records for details. No past medical history on file. Past Surgical History:   Procedure Laterality Date    HX WISDOM TEETH EXTRACTION       Social History     Occupational History    Not on file. Social History Main Topics    Smoking status: Never Smoker    Smokeless tobacco: Never Used    Alcohol use No    Drug use: No    Sexual activity: Yes     Partners: Male     Birth control/ protection: None     No family history on file. No Known Allergies  Prior to Admission medications    Medication Sig Start Date End Date Taking? Authorizing Provider   PNV BK.47/CENXAPA FUM/FOLIC AC (PRENATAL PO) Take 1 Tab by mouth daily. Yes Historical Provider        Review of Systems: Pertinent items are noted in HPI. Objective:     Vitals:  Vitals:    17 1026 17 1045   BP: 116/75    Pulse: 91    Resp: 16    Temp: 98.1 °F (36.7 °C)    Weight:  93.4 kg (206 lb)   Height:  5' 8\" (1.727 m)        Physical Exam:  Patient without distress. Abdomen: soft, nontender  Fundus: soft and non tender  Perineum: blood absent, amniotic fluid absent  Cervical Exam: 4 cm dilated    60% effaced    -1 station    Membranes:  Artificial Rupture of Membranes;  Amniotic Fluid: medium amount of clear fluid  Fetal Heart Rate: Baseline: 135 per minute  Variability: moderate  Accelerations: yes  Decelerations: none    Prenatal Labs:   Lab Results   Component Value Date/Time    Rubella, External Immune 06/20/2017    GrBStrep, External negative 08/10/2017    HBsAg, External negative 06/20/2017    HIV, External negative 06/20/2017    RPR, External non reactive 06/20/2017    Gonorrhea, External negative 06/20/2017    Chlamydia, External negative 06/20/2017    ABO,Rh A negative 06/20/2017         Assessment/Plan: 40+ wk IUP for induction of labor with favorable cervix. Active Problems:    * No active hospital problems. *       Plan: Admit for Reassuring fetal status, Continue plan for vaginal delivery. Group B Strep was negative.     Signed By:  Donovan Kelly MD     September 5, 2017

## 2017-09-05 NOTE — PROCEDURES
Delivery Note    Obstetrician:  Vero Louis MD    Assistant: none    Pre-Delivery Diagnosis: Term pregnancy, Induced labor or Single fetus    Post-Delivery Diagnosis: Living  infant(s) or Male    Intrapartum Event: None    Procedure: Spontaneous vaginal delivery    Epidural: YES    Monitor:  Fetal Heart Tones - External and Uterine Contractions - External    Indications for instrumental delivery: none    Estimated Blood Loss: 250 ml    Episiotomy: none    Laceration(s):  none    Laceration(s) repair: NO    Presentation: Cephalic    Fetal Description: ruiz    Fetal Position: Occiput Anterior    Birth Weight: pending    Birth Length: pending    Apgar - One Minute: 8    Apgar - Five Minutes: 9    Umbilical Cord: Nuchal Cord x  1 and 3 vessels present    Specimens: none           Complications:  none           Cord Blood Results:   Information for the patient's newbornJohnny Ponce [[id number]]   No results found for: Susie Eng, BILI, ABORH    Prenatal Labs:     Lab Results   Component Value Date/Time    HBsAg, External negative 2017    HIV, External negative 2017    Rubella, External Immune 2017    RPR, External non reactive 2017    Gonorrhea, External negative 2017    Chlamydia, External negative 2017    GrBStrep, External negative 08/10/2017        Attending Attestation: I was present and scrubbed for the entire procedure    Signed By:  Vero Louis MD     2017

## 2017-09-05 NOTE — ANESTHESIA PROCEDURE NOTES
Epidural Block    Start time: 9/5/2017 2:02 PM  End time: 9/5/2017 2:12 PM  Performed by: Gildardo Gonzales  Authorized by: Gildardo Gonzales     Pre-Procedure  Indication: labor epidural    Preanesthetic Checklist: patient identified, risks and benefits discussed, anesthesia consent, timeout performed and anesthesia consent      Epidural:   Patient position:  Seated  Prep region:  Lumbar  Prep: Betadine    Location:  L3-4    Needle and Epidural Catheter:   Needle Type:  Tuohy  Needle Gauge:  17 G  Injection Technique:  Loss of resistance using air  Attempts:  1  Catheter Size:  18 G  Events: no paresthesia and negative aspiration test    Test Dose:  Lidocaine 1.5% w/ epi and negative    Assessment:   Catheter Secured:  Tegaderm and tape  Insertion:  Uncomplicated  Patient tolerance:  Patient tolerated the procedure well with no immediate complications

## 2017-09-06 PROCEDURE — 74011250636 HC RX REV CODE- 250/636: Performed by: OBSTETRICS & GYNECOLOGY

## 2017-09-06 PROCEDURE — 74011250637 HC RX REV CODE- 250/637: Performed by: OBSTETRICS & GYNECOLOGY

## 2017-09-06 PROCEDURE — 85461 HEMOGLOBIN FETAL: CPT | Performed by: OBSTETRICS & GYNECOLOGY

## 2017-09-06 PROCEDURE — 86900 BLOOD TYPING SEROLOGIC ABO: CPT | Performed by: OBSTETRICS & GYNECOLOGY

## 2017-09-06 PROCEDURE — 65270000029 HC RM PRIVATE

## 2017-09-06 PROCEDURE — 36415 COLL VENOUS BLD VENIPUNCTURE: CPT | Performed by: OBSTETRICS & GYNECOLOGY

## 2017-09-06 RX ADMIN — IBUPROFEN 800 MG: 800 TABLET, FILM COATED ORAL at 19:55

## 2017-09-06 RX ADMIN — MUPIROCIN: 20 OINTMENT TOPICAL at 10:08

## 2017-09-06 RX ADMIN — HYDROCODONE BITARTRATE AND ACETAMINOPHEN 1 TABLET: 5; 325 TABLET ORAL at 09:22

## 2017-09-06 RX ADMIN — HYDROCODONE BITARTRATE AND ACETAMINOPHEN 1 TABLET: 5; 325 TABLET ORAL at 04:40

## 2017-09-06 RX ADMIN — HUMAN RHO(D) IMMUNE GLOBULIN 0.3 MG: 300 INJECTION, SOLUTION INTRAMUSCULAR at 10:12

## 2017-09-06 RX ADMIN — IBUPROFEN 800 MG: 800 TABLET, FILM COATED ORAL at 03:37

## 2017-09-06 RX ADMIN — HYDROCODONE BITARTRATE AND ACETAMINOPHEN 1 TABLET: 5; 325 TABLET ORAL at 00:19

## 2017-09-06 RX ADMIN — IBUPROFEN 800 MG: 800 TABLET, FILM COATED ORAL at 11:55

## 2017-09-06 NOTE — PROGRESS NOTES
Post-Partum Day Number 1 Progress Note    Patient doing well post-partum without significant complaint. Vitals:  Patient Vitals for the past 8 hrs:   BP Temp Pulse Resp   17 0633 106/66 98.3 °F (36.8 °C) 67 14     Temp (24hrs), Av.1 °F (36.7 °C), Min:97.5 °F (36.4 °C), Max:98.4 °F (36.9 °C)      Vital signs stable, afebrile. Exam:  Patient without distress. Abdomen soft, fundus firm below umbilicus, nontender                              Lower extremities are negative for swelling, cords or tenderness. Lab/Data Review: All lab results for the last 24 hours reviewed. Assessment and Plan:  Patient appears to be having uncomplicated post-partum course. Continue routine perineal care and maternal education. Plan discharge tomorrow if no problems occur. Pt desires to have the baby circumcised, risks/benefits/alternatives d/w pt.

## 2017-09-06 NOTE — ROUTINE PROCESS
Bedside and Verbal shift change report given to NICOLAS Marie RN and SELIN Jones RN (oncoming nurse) by Renetta Garcia. Komal Leal (offgoing nurse). Report included the following information SBAR, Procedure Summary, Intake/Output, MAR, Accordion, Recent Results and Med Rec Status.

## 2017-09-06 NOTE — PROGRESS NOTES
Bedside and Verbal shift change report given to MAHAD Lopez (oncoming nurse) by SELIN Jones RN (offgoing nurse). Report included the following information SBAR, Kardex and MAR.

## 2017-09-06 NOTE — PROGRESS NOTES
1900: Bedside SBAR report recived from [employer] RN, assumed care of patient at this time  1945: pt attempted to get up for first time, upon standing at bedside she began to feel dizzy, pt assisted back to bed. Agreed to try to use bedpan, voided 925ml at this time. Bleeding well controlled, bedpan tolerated well. 2015: epidural line removed, tip intact. 2020: Bedside SBAR report given to SHAILESH Martínez RN on MIU, transfer of patient care complete at this time.

## 2017-09-06 NOTE — LACTATION NOTE
Yobani Alford Lactation Consultant Signed  Progress Notes Date of Service: 17 1126         Problem: Lactation Care Plan  Goal: *Infant latching appropriately  Outcome: Progressing Towards Goal  Pt will successfully establish breastfeeding by feeding in response to infant's early feeding cues and/or to offer breast every 2-3 hours. Ways to obtain a deep latch and seek comfortable positioning shared, aware to keep log of feedings/output. Goal: *Weight loss less than 10% of birth weight  Outcome: Progressing Towards Goal  Current infant weight loss is -0.5%  Reviewed breastfeeding basics:  Supply and demand,  stomach size, early  Feeding cues, skin to skin, positioning and baby led latch-on, assymetrical latch with signs of good, deep latch vs shallow, feeding frequency and duration, and log sheet for tracking infant feedings and output. Breastfeeding Booklet and Warm line information given. Discussed typical  weight loss and the importance of infant weight checks with pediatrician 1-2 post discharge.     Problem: Patient Education: Go to Patient Education Activity  Goal: Patient/Family Education  Outcome: Progressing Towards Goal  Discussed with mother her plan for feeding. Reviewed the benefits of exclusive breast milk feeding during the hospital stay. Informed her of the risks of using formula to supplement in the first few days of life as well as the benefits of successful breast milk feeding; referred her to the Breastfeeding booklet about this information. She acknowledges understanding of information reviewed and states that it is her plan to breastfeed  her infant. Will support her choice and offer additional information as needed.      Comments:   Hand Expression Education:  Mom taught how to manually hand express her colostrum.   Emphasized the importance of providing infant with valuable colostrum as infant rests skin to skin at breast.  Aware to avoid extended periods of non-feeding. Aware to offer 10-20+ drops of colostrum every 2-3 hours until infant is latching and nursing effectively. Taught the rationale behind this low tech but highly effective evidence based practice.      Pt will successfully establish breastfeeding by feeding in response to early feeding cues   or wake every 3h, will obtain deep latch, and will keep log of feedings/output.   Taught to BF at hunger cues and or q 2-3 hrs and to offer 10-20 drops of hand expressed colostrum at any non-feeds.        Breast Assessment  Left Breast: Medium  Left Nipple: Everted, Intact  Right Breast: Medium  Right Nipple: Everted, Intact, Tender  Breast- Feeding Assessment  Attends Breast-Feeding Classes: No  Breast-Feeding Experience: Yes ( 1st baby x 3 months)  Breast Trauma/Surgery: No  Type/Quality: Good  Lactation Consultant Visits  Breast-Feedings: Good   Mother/Infant Observation  Mother Observation: Alignment, Breast comfortable, Close hold, Holds breast, Nipple round on release, Recognizes feeding cues  Infant Observation: Audible swallows, Feeding cues, Frenulum checked, Latches nipple and aereolae, Lips flanged, lower, Opens mouth, Rhythmic suck, Lips flanged, upper  LATCH Documentation  Latch: Grasps breast, tongue down, lips flanged, rhythmic sucking  Audible Swallowing: A few with stimulation  Type of Nipple: Everted (after stimulation)  Comfort (Breast/Nipple): Soft/non-tender  Hold (Positioning): No assist from staff, mother able to position/hold infant  LATCH Score: 9

## 2017-09-06 NOTE — LACTATION NOTE
This note was copied from a baby's chart. Mother resting in bed. Baby under phototherapy for elevated bilirubin. Mother states baby last breast fed at [phone] for 10 minutes on each breast. LC discussed the importance of frequent feedings (Q 2-3 hr.) in order to help improve baby's bilirubin levels. LC also set up a symphony breast pump in room and instructed mother to pump 3 times daily to help stimulate her milk supply and bring in her milk. Discussed storage of breast milk. Mother to call [facility] if she has any breast feeding needs.

## 2017-09-06 NOTE — ROUTINE PROCESS
SBAR IN Report: Mother    Verbal report received from Alec Fatima RN (full name & credentials) on this patient, who is now being transferred from L&D (unit) for routine progression of care. The patient is not wearing a green \"Anesthesia-Duramorph\" band. Report consisted of patient's Situation, Background, Assessment and Recommendations (SBAR). [place] ID bands were compared with the identification form, and verified with the patient and transferring nurse. Information from the SBAR, Procedure Summary, Intake/Output, MAR, Accordion, Recent Results and Med Rec Status and the Jenny Report was reviewed with the transferring nurse; opportunity for questions and clarification provided.

## 2017-09-07 VITALS
WEIGHT: 206 LBS | BODY MASS INDEX: 31.22 KG/M2 | RESPIRATION RATE: 16 BRPM | SYSTOLIC BLOOD PRESSURE: 124 MMHG | TEMPERATURE: 97.8 F | HEART RATE: 77 BPM | DIASTOLIC BLOOD PRESSURE: 75 MMHG | HEIGHT: 68 IN | OXYGEN SATURATION: 100 %

## 2017-09-07 LAB
ABO + RH BLD: NORMAL
BLD PROD TYP BPU: NORMAL
BPU ID: NORMAL
FETAL SCREEN,FMHS: NORMAL
STATUS OF UNIT,%ST: NORMAL
UNIT DIVISION, %UDIV: 0
WEAK D AG RBC QL: NORMAL

## 2017-09-07 PROCEDURE — 74011250637 HC RX REV CODE- 250/637: Performed by: OBSTETRICS & GYNECOLOGY

## 2017-09-07 RX ADMIN — IBUPROFEN 800 MG: 800 TABLET, FILM COATED ORAL at 03:58

## 2017-09-07 NOTE — PROGRESS NOTES
09/07/17 12:04 PM  CM met with NICK and her boyfriend/FOB Chano Portillo ([phone]) to complete initial assessment and to begin discharge planning. Demographics were reviewed and confirmed. The couple live together at the charted address with their 3year old son. NICK is breastfeeding. She has Medicaid and [facility] services and is aware of the process to add the baby to both. Patient has car seat, crib, clothing, and other necessary supplies. Pediatrician will be at [facility]; appointment scheduled for Friday at [place] [address] for avery crawford noted and discussed with parents; referral sent to [facility]. Will deliver to the hospital by 1PM; family and nursing updated. Care Management Interventions  PCP Verified by CM:  Yes  Transition of Care Consult (CM Consult): Discharge Planning  Current Support Network: Lives with Spouse  Confirm Follow Up Transport: Family  Plan discussed with Pt/Family/Caregiver: Yes  Discharge Location  Discharge Placement: [address] [place] [address]

## 2017-09-07 NOTE — PROGRESS NOTES
Patient off unit in stable condition via wheelchair with [employer] for discharge home per Dr. Ever Rdz. Patient is to follow up in 6 weeks and is aware. Prescriptions given to patient. Patient denies any headache, dizziness, nausea/vomitting, or pain at this time. Infant in car seat with mother.

## 2017-09-07 NOTE — PROGRESS NOTES
Bedside and Verbal shift change report given to Arabella Main RN (oncoming nurse) by Florian Nixon RN (offgoing nurse). Report included the following information SBAR, Kardex and MAR.

## 2017-09-07 NOTE — PROGRESS NOTES
PostPartum Note    Skyler Green  [id number]  1995  24 y.o.    S:  Ms. Skyler Green is a 24 y.o.  PPD #2 s/p TSVD @ 40w4d. Doing well. She had a baby boy. Her lochia is like a period. She describes her pain as mild and is well controlled with PO medications. She is breast feeding and this is going well. She is ambulating and voiding. Tolerating PO intake. O:   Visit Vitals    /75 (BP 1 Location: Left arm, BP Patient Position: At rest)    Pulse 77    Temp 97.8 °F (36.6 °C)    Resp 16    Ht 5' 8\" (1.727 m)    Wt 93.4 kg (206 lb)    SpO2 100%    BMI 31.32 kg/m2       Lab Results   Component Value Date/Time    WBC 10.0 2017 10:37 AM    HGB 11.0 2017 10:37 AM    HCT 35.8 2017 10:37 AM    PLATELET 374  10:37 AM    MCV 81.9 2017 10:37 AM    Hgb, External 11.2 2017    Hct, External 34.8 2017       Gen - No acute distress  Abdomen - Fundus firm, below the umbilicus   Ext - Warm, well perfused. Nontender    A/P:  PPD #2 s/p TSVD @ 40w4d doing well. 1.  Routine PP instructions/ care discussed  2. Blood type - Rh neg  3. Rubella non-imm-- to be given before discharge  4. Circumcision performed  5. Discharge home today  6. F/U 4-6 weeks for PP check.       Kamar Ignacio MD  [facility]

## 2018-06-11 ENCOUNTER — ED HISTORICAL/CONVERTED ENCOUNTER (OUTPATIENT)
Dept: OTHER | Age: 23
End: 2018-06-11

## 2022-03-18 PROBLEM — Z34.90 PREGNANCY: Status: ACTIVE | Noted: 2017-08-01

## 2022-06-27 LAB
HBSAG, EXTERNAL: NEGATIVE
HIV, EXTERNAL: NON REACTIVE
RUBELLA, EXTERNAL: NORMAL
T. PALLIDUM, EXTERNAL: NON REACTIVE

## 2022-10-25 NOTE — IP AVS SNAPSHOT
As above   Mili Jaramillo 
 
 
 [address] [facility] [facility] 
[phone] Patient: Cheryl Rodriguez MRN: [id number] :1995 You are allergic to the following No active allergies Recent Documentation Height Weight BMI OB Status Smoking Status 1.727 m 93.4 kg 31.32 kg/m2 Pregnant Never Smoker Emergency Contacts Name Discharge Info Relation Home Work Mobile [address] [5] [phone] About your hospitalization You were admitted on:  2017 You last received care in the:  [facility] 2 LABOR & DELIVERY You were discharged on:  2017 Unit phone number:  [phone] Why you were hospitalized Your primary diagnosis was:  Not on File Your diagnoses also included:  Pregnancy Providers Seen During Your Hospitalizations Provider Role Specialty Primary office phone Eric Santos MD Attending Provider Obstetrics & Gynecology [phone] Your Primary Care Physician (PCP) Primary Care Physician Office Phone Office Fax Catherine Alonso [phone] Follow-up Information Follow up With Details Comments Contact Info Eric Santos MD Go to Regularly scheduled appointment [address] 
[phone] Current Discharge Medication List  
  
CONTINUE these medications which have NOT CHANGED Dose & Instructions Dispensing Information Comments Morning Noon Evening Bedtime PRENATAL PO Your last dose was: Your next dose is:    
   
   
 Dose:  1 Tab Take 1 Tab by mouth daily. Refills:  0 Discharge Instructions Griffith Serene Contractions: Care Instructions Your Care Instructions [place] Santana contractions prepare your uterus for labor.  Think of them as a \"warm-up\" exercise that your body does. You may begin to feel them between the 28th and 30th weeks of your pregnancy. But they start as early as the 20th week. [place] Santana contractions usually occur more often during the ninth month. They may go away when you are active and return when you rest. These contractions are like mild contractions of true labor, but they occur less often. (You feel fewer than 8 in an hour.) They don't cause your cervix to open. It may be hard for you to tell the difference between [facility] contractions and true labor, especially in your first pregnancy. Follow-up care is a key part of your treatment and safety. Be sure to make and go to all appointments, and call your doctor if you are having problems. It's also a good idea to know your test results and keep a list of the medicines you take. How can you care for yourself at home? · Try a warm bath to help relieve muscle tension and reduce pain. · Change positions every 30 minutes. Take breaks if you must sit for a long time. Get up and walk around. · Drink plenty of water, enough so that your urine is light yellow or clear like water. · Taking short walks may help you feel better. Your doctor needs to check any contractions that are getting stronger or closer together. Where can you learn more? Go to http://dangelo-rad.info/. Enter [id number] in the search box to learn more about \"[place] Santana Contractions: Care Instructions. \" Current as of: March 16, 2017 Content Version: 11.3 © 8476-7746 [employer]. Care instructions adapted under license by [employer] (which disclaims liability or warranty for this information). If you have questions about a medical condition or this instruction, always ask your healthcare professional. [place] [address] any warranty or liability for your use of this information. Pregnancy Precautions: Care Instructions Your Care Instructions There is no sure way to prevent labor before your due date ( labor) or to prevent most other pregnancy problems. But there are things you can do to increase your chances of a healthy pregnancy. Go to your appointments, follow your doctor's advice, and take good care of yourself. Eat well, and exercise (if your doctor agrees). And make sure to drink plenty of water. Follow-up care is a key part of your treatment and safety. Be sure to make and go to all appointments, and call your doctor if you are having problems. It's also a good idea to know your test results and keep a list of the medicines you take. How can you care for yourself at home? · Make sure you go to your prenatal appointments. At each visit, your doctor will check your blood pressure. Your doctor will also check to see if you have protein in your urine. High blood pressure and protein in urine are signs of preeclampsia. This condition can be dangerous for you and your baby. · Drink plenty of fluids, enough so that your urine is light yellow or clear like water. Dehydration can cause contractions. If you have kidney, heart, or liver disease and have to limit fluids, talk with your doctor before you increase the amount of fluids you drink. · Tell your doctor right away if you notice any symptoms of an infection, such as: ¨ Burning when you urinate. ¨ A foul-smelling discharge from your vagina. ¨ Vaginal itching. ¨ Unexplained fever. ¨ Unusual pain or soreness in your uterus or lower belly. · Eat a balanced diet. Include plenty of foods that are high in calcium and iron. ¨ Foods high in calcium include milk, cheese, yogurt, almonds, and broccoli. ¨ Foods high in iron include red meat, shellfish, poultry, eggs, beans, raisins, whole-grain bread, and leafy green vegetables. · Do not smoke. If you need help quitting, talk to your doctor about stop-smoking programs and medicines.  These can increase your chances of quitting for good. · Do not drink alcohol or use illegal drugs. · Follow your doctor's directions about activity. Your doctor will let you know how much, if any, exercise you can do. · Ask your doctor if you can have sex. If you are at risk for early labor, your doctor may ask you to not have sex. · Take care to prevent falls. During pregnancy, your joints are loose, and your balance is off. Sports such as bicycling, skiing, or in-line skating can increase your risk of falling. And don't ride horses or motorcycles, dive, water ski, scuba dive, or parachute jump while you are pregnant. · Avoid getting very hot. Do not use saunas or hot tubs. Avoid staying out in the sun in hot weather for long periods. Take acetaminophen (Tylenol) to lower a high fever. · Do not take any over-the-counter or herbal medicines or supplements without talking to your doctor or pharmacist first. 
When should you call for help? Call 911 anytime you think you may need emergency care. For example, call if: 
· You passed out (lost consciousness). · You have severe vaginal bleeding. · You have severe pain in your belly or pelvis. · You have had fluid gushing or leaking from your vagina and you know or think the umbilical cord is bulging into your vagina. If this happens, immediately get down on your knees so your rear end (buttocks) is higher than your head. This will decrease the pressure on the cord until help arrives. Call your doctor now or seek immediate medical care if: 
· You have signs of preeclampsia, such as: 
¨ Sudden swelling of your face, hands, or feet. ¨ New vision problems (such as dimness or blurring). ¨ A severe headache. · You have any vaginal bleeding. · You have belly pain or cramping. · You have a fever. · You have had regular contractions (with or without pain) for an hour. This means that you have 8 or more within 1 hour or 4 or more in 20 minutes after you change your position and drink fluids. · You have a sudden release of fluid from your vagina. · You have low back pain or pelvic pressure that does not go away. · You notice that your baby has stopped moving or is moving much less than normal. 
Watch closely for changes in your health, and be sure to contact your doctor if you have any problems. Where can you learn more? Go to http://dangelo-rad.info/. Enter [id number] in the search box to learn more about \"Pregnancy Precautions: Care Instructions. \" Current as of: 2017 Content Version: 11.3 © 9730-5129 [employer]. Care instructions adapted under license by [employer] (which disclaims liability or warranty for this information). If you have questions about a medical condition or this instruction, always ask your healthcare professional. [place] [address] any warranty or liability for your use of this information. Weeks 34 to 36 of Your Pregnancy: Care Instructions Your Care Instructions By now, your baby and your belly have grown quite large. It is almost time to give birth. A full-term pregnancy can deliver between 37 and 42 weeks. Your baby's lungs are almost ready to breathe air. The bones in your baby's head are now firm enough to protect it, but soft enough to move down through the birth canal. 
You may feel excited, happy, anxious, or scared. You may wonder how you will know if you are in labor or what to expect during labor. Try to be flexible in your expectations of the birth. Because each birth is different, there is no way to know exactly what childbirth will be like for you. This care sheet will help you know what to expect and how to prepare. This may make your childbirth easier. If you haven't already had the Tdap shot during this pregnancy, talk to your doctor about getting it. It will help protect your  against pertussis infection. In the 36th week, most women have a test for group B streptococcus (GBS). GBS is a common bacteria that can live in the vagina and rectum. It can make your baby sick after birth. If you test positive, you will get antibiotics during labor. The medicine will keep your baby from getting the bacteria. Follow-up care is a key part of your treatment and safety. Be sure to make and go to all appointments, and call your doctor if you are having problems. It's also a good idea to know your test results and keep a list of the medicines you take. How can you care for yourself at home? Learn about pain relief choices · Pain is different for every woman. Talk with your doctor about your feelings about pain. · You can choose from several types of pain relief. These include medicine or breathing techniques, as well as comfort measures. You can use more than one option. · If you choose to have pain medicine during labor, talk to your doctor about your options. Some medicines lower anxiety and help with some of the pain. Others make your lower body numb so that you won't feel pain. · Be sure to tell your doctor about your pain medicine choice before you start labor or very early in your labor. You may be able to change your mind as labor progresses. · Rarely, a woman is put to sleep by medicine given through a mask or an IV. Labor and delivery · The first stage of labor has three parts: early, active, and transition. ¨ Most women have early labor at home. You can stay busy or rest, eat light snacks, drink clear fluids, and start counting contractions. ¨ When talking during a contraction gets hard, you may be moving to active labor. During active labor, you should head for the hospital if you are not there already. ¨ You are in active labor when contractions come every 3 to 4 minutes and last about 60 seconds. Your cervix is opening more rapidly. ¨ If your water breaks, contractions will come faster and stronger. ¨ During transition, your cervix is stretching, and contractions are coming more rapidly. ¨ You may want to push, but your cervix might not be ready. Your doctor will tell you when to push. · The second stage starts when your cervix is completely opened and you are ready to push. ¨ Contractions are very strong to push the baby down the birth canal. 
¨ You will feel the urge to push. You may feel like you need to have a bowel movement. ¨ You may be coached to push with contractions. These contractions will be very strong, but you will not have them as often. You can get a little rest between contractions. ¨ You may be emotional and irritable. You may not be aware of what is going on around you. ¨ One last push, and your baby is born. · The third stage is when a few more contractions push out the placenta. This may take 30 minutes or less. · The fourth stage is the welcome recovery. You may feel overwhelmed with emotions and exhausted but alert. This is a good time to start breastfeeding. Where can you learn more? Go to http://dangelo-rad.info/. Enter V306 in the search box to learn more about \"Weeks 34 to 36 of Your Pregnancy: Care Instructions. \" Current as of: March 16, 2017 Content Version: 11.3 © 4927-7799 [employer]umin[employer] Technologies. Care instructions adapted under license by [employer] (which disclaims liability or warranty for this information). If you have questions about a medical condition or this instruction, always ask your healthcare professional. [place] [address] any warranty or liability for your use of this information. Counting Your Baby's Kicks: Care Instructions Your Care Instructions Counting your baby's kicks is one way your doctor can tell that your baby is healthy. Most womenespecially in a first pregnancyfeel their baby move for the first time between 16 and 22 weeks.  The movement may feel like flutters rather than kicks. Your baby may move more at certain times of the day. When you are active, you may notice less kicking than when you are resting. At your prenatal visits, your doctor will ask whether the baby is active. In your last trimester, your doctor may ask you to count the number of times you feel your baby move. Follow-up care is a key part of your treatment and safety. Be sure to make and go to all appointments, and call your doctor if you are having problems. It's also a good idea to know your test results and keep a list of the medicines you take. How do you count fetal kicks? · A common method of checking your baby's movement is to count the number of kicks or moves you feel in 1 hour. Ten movements (such as kicks, flutters, or rolls) in 1 hour are normal. Some doctors suggest that you count in the morning until you get to 10 movements. Then you can quit for that day and start again the next day. · Pick your baby's most active time of day to count. This may be any time from morning to evening. · If you do not feel 10 movements in an hour, your baby may be sleeping. Wait for the next hour and count again. When should you call for help? Call your doctor now or seek immediate medical care if: 
· You noticed that your baby has stopped moving or is moving much less than normal. 
Watch closely for changes in your health, and be sure to contact your doctor if you have any problems. Where can you learn more? Go to http://dangelo-rad.info/. Enter W580 in the search box to learn more about \"Counting Your Baby's Kicks: Care Instructions. \" Current as of: March 16, 2017 Content Version: 11.3 © 6014-1107 [email or web address]. Care instructions adapted under license by [id number] (which disclaims liability or warranty for this information).  If you have questions about a medical condition or this instruction, always ask your healthcare professional. [place] [address] any warranty or liability for your use of this information. Discharge Orders None Introducing [facility]! Ana M Abebe introduces ClickingHouse patient portal. Now you can access parts of your medical record, email your doctor's office, and request medication refills online. 1. In your internet browser, go to [email or web address] 2. Click on the First Time User? Click Here link in the Sign In box. You will see the New Member Sign Up page. 3. Enter your ClickingHouse Access Code exactly as it appears below. You will not need to use this code after youve completed the sign-up process. If you do not sign up before the expiration date, you must request a new code. · ClickingHouse Access Code: [email or web address] Expires: 10/31/2017  8:18 AM 
 
4. Enter the last four digits of your Social Security Number (xxxx) and Date of Birth (mm/dd/yyyy) as indicated and click Submit. You will be taken to the next sign-up page. 5. Create a ClickingHouse ID. This will be your ClickingHouse login ID and cannot be changed, so think of one that is secure and easy to remember. 6. Create a ClickingHouse password. You can change your password at any time. 7. Enter your Password Reset Question and Answer. This can be used at a later time if you forget your password. 8. Enter your e-mail address. You will receive e-mail notification when new information is available in [address] 9. Click Sign Up. You can now view and download portions of your medical record. 10. Click the Download Summary menu link to download a portable copy of your medical information. If you have questions, please visit the Frequently Asked Questions section of the ClickingHouse website. Remember, ClickingHouse is NOT to be used for urgent needs. For medical emergencies, dial 911. Now available from your iPhone and Android! General Information Please provide this summary of care documentation to your next provider. Patient Signature:  ____________________________________________________________ Date:  ____________________________________________________________  
  
Edrie Gunnels Provider Signature:  ____________________________________________________________ Date:  ____________________________________________________________

## 2022-12-29 LAB — GRBS, EXTERNAL: NEGATIVE

## 2023-01-30 ENCOUNTER — HOSPITAL ENCOUNTER (INPATIENT)
Age: 28
LOS: 2 days | Discharge: HOME | DRG: 560 | End: 2023-02-01
Attending: OBSTETRICS & GYNECOLOGY | Admitting: OBSTETRICS & GYNECOLOGY
Payer: COMMERCIAL

## 2023-01-30 PROBLEM — Z34.90 PREGNANT: Status: ACTIVE | Noted: 2023-01-30

## 2023-01-30 LAB
ABO + RH BLD: NORMAL
BASOPHILS # BLD: 0.1 K/UL (ref 0–0.1)
BASOPHILS NFR BLD: 0 % (ref 0–1)
BLOOD GROUP ANTIBODIES SERPL: NORMAL
BLOOD GROUP ANTIBODIES SERPL: POSITIVE
DIFFERENTIAL METHOD BLD: ABNORMAL
EOSINOPHIL # BLD: 0.1 K/UL (ref 0–0.4)
EOSINOPHIL NFR BLD: 0 % (ref 0–7)
ERYTHROCYTE [DISTWIDTH] IN BLOOD BY AUTOMATED COUNT: 12.4 % (ref 11.5–14.5)
HCT VFR BLD AUTO: 37.4 % (ref 35–47)
HGB BLD-MCNC: 12.4 G/DL (ref 11.5–16)
IMM GRANULOCYTES # BLD AUTO: 0.3 K/UL (ref 0–0.04)
IMM GRANULOCYTES NFR BLD AUTO: 2 % (ref 0–0.5)
LYMPHOCYTES # BLD: 3.8 K/UL (ref 0.8–3.5)
LYMPHOCYTES NFR BLD: 24 % (ref 12–49)
MCH RBC QN AUTO: 29 PG (ref 26–34)
MCHC RBC AUTO-ENTMCNC: 33.2 G/DL (ref 30–36.5)
MCV RBC AUTO: 87.6 FL (ref 80–99)
MONOCYTES # BLD: 1.4 K/UL (ref 0–1)
MONOCYTES NFR BLD: 9 % (ref 5–13)
NEUTS SEG # BLD: 10.3 K/UL (ref 1.8–8)
NEUTS SEG NFR BLD: 65 % (ref 32–75)
NRBC # BLD: 0 K/UL (ref 0–0.01)
NRBC BLD-RTO: 0 PER 100 WBC
PLATELET # BLD AUTO: 303 K/UL (ref 150–400)
PMV BLD AUTO: 10.2 FL (ref 8.9–12.9)
RBC # BLD AUTO: 4.27 M/UL (ref 3.8–5.2)
SPECIMEN EXP DATE BLD: NORMAL
WBC # BLD AUTO: 15.9 K/UL (ref 3.6–11)
XXAUTO CONTROL: NEGATIVE

## 2023-01-30 PROCEDURE — 85025 COMPLETE CBC W/AUTO DIFF WBC: CPT

## 2023-01-30 PROCEDURE — 75410000003 HC RECOV DEL/VAG/CSECN EA 0.5 HR

## 2023-01-30 PROCEDURE — 75410000002 HC LABOR FEE PER 1 HR

## 2023-01-30 PROCEDURE — 86870 RBC ANTIBODY IDENTIFICATION: CPT

## 2023-01-30 PROCEDURE — 99283 EMERGENCY DEPT VISIT LOW MDM: CPT

## 2023-01-30 PROCEDURE — 36415 COLL VENOUS BLD VENIPUNCTURE: CPT

## 2023-01-30 PROCEDURE — 74011250637 HC RX REV CODE- 250/637: Performed by: OBSTETRICS & GYNECOLOGY

## 2023-01-30 PROCEDURE — 4A1HXCZ MONITORING OF PRODUCTS OF CONCEPTION, CARDIAC RATE, EXTERNAL APPROACH: ICD-10-PCS | Performed by: OBSTETRICS & GYNECOLOGY

## 2023-01-30 PROCEDURE — 75410000000 HC DELIVERY VAGINAL/SINGLE

## 2023-01-30 PROCEDURE — 10907ZC DRAINAGE OF AMNIOTIC FLUID, THERAPEUTIC FROM PRODUCTS OF CONCEPTION, VIA NATURAL OR ARTIFICIAL OPENING: ICD-10-PCS | Performed by: OBSTETRICS & GYNECOLOGY

## 2023-01-30 PROCEDURE — 65410000002 HC RM PRIVATE OB

## 2023-01-30 PROCEDURE — 86900 BLOOD TYPING SEROLOGIC ABO: CPT

## 2023-01-30 RX ORDER — IBUPROFEN 800 MG/1
800 TABLET ORAL EVERY 8 HOURS
Status: DISCONTINUED | OUTPATIENT
Start: 2023-01-30 | End: 2023-02-01 | Stop reason: HOSPADM

## 2023-01-30 RX ORDER — DIPHENHYDRAMINE HCL 25 MG
25 CAPSULE ORAL
Status: DISCONTINUED | OUTPATIENT
Start: 2023-01-30 | End: 2023-02-01 | Stop reason: HOSPADM

## 2023-01-30 RX ORDER — FACIAL-BODY WIPES
10 EACH TOPICAL DAILY PRN
Status: DISCONTINUED | OUTPATIENT
Start: 2023-01-30 | End: 2023-02-01 | Stop reason: HOSPADM

## 2023-01-30 RX ORDER — ZOLPIDEM TARTRATE 5 MG/1
5 TABLET ORAL
Status: DISCONTINUED | OUTPATIENT
Start: 2023-01-30 | End: 2023-02-01 | Stop reason: HOSPADM

## 2023-01-30 RX ORDER — OXYTOCIN/RINGER'S LACTATE 30/500 ML
10 PLASTIC BAG, INJECTION (ML) INTRAVENOUS AS NEEDED
Status: DISCONTINUED | OUTPATIENT
Start: 2023-01-30 | End: 2023-02-01 | Stop reason: HOSPADM

## 2023-01-30 RX ORDER — MINERAL OIL
120 OIL (ML) ORAL ONCE
Status: DISCONTINUED | OUTPATIENT
Start: 2023-01-30 | End: 2023-01-30

## 2023-01-30 RX ORDER — ONDANSETRON 4 MG/1
4 TABLET, ORALLY DISINTEGRATING ORAL
Status: DISCONTINUED | OUTPATIENT
Start: 2023-01-30 | End: 2023-02-01 | Stop reason: HOSPADM

## 2023-01-30 RX ORDER — ONDANSETRON 2 MG/ML
4 INJECTION INTRAMUSCULAR; INTRAVENOUS
Status: DISCONTINUED | OUTPATIENT
Start: 2023-01-30 | End: 2023-01-30

## 2023-01-30 RX ORDER — SODIUM CHLORIDE 0.9 % (FLUSH) 0.9 %
5-40 SYRINGE (ML) INJECTION EVERY 8 HOURS
Status: DISCONTINUED | OUTPATIENT
Start: 2023-01-30 | End: 2023-01-30

## 2023-01-30 RX ORDER — HYDROCODONE BITARTRATE AND ACETAMINOPHEN 5; 325 MG/1; MG/1
1 TABLET ORAL
Status: DISCONTINUED | OUTPATIENT
Start: 2023-01-30 | End: 2023-02-01 | Stop reason: HOSPADM

## 2023-01-30 RX ORDER — OXYTOCIN/RINGER'S LACTATE 30/500 ML
87.3 PLASTIC BAG, INJECTION (ML) INTRAVENOUS AS NEEDED
Status: DISCONTINUED | OUTPATIENT
Start: 2023-01-30 | End: 2023-02-01 | Stop reason: HOSPADM

## 2023-01-30 RX ORDER — SODIUM CHLORIDE, SODIUM LACTATE, POTASSIUM CHLORIDE, CALCIUM CHLORIDE 600; 310; 30; 20 MG/100ML; MG/100ML; MG/100ML; MG/100ML
125 INJECTION, SOLUTION INTRAVENOUS CONTINUOUS
Status: DISCONTINUED | OUTPATIENT
Start: 2023-01-30 | End: 2023-01-30

## 2023-01-30 RX ORDER — NALOXONE HYDROCHLORIDE 0.4 MG/ML
0.4 INJECTION, SOLUTION INTRAMUSCULAR; INTRAVENOUS; SUBCUTANEOUS AS NEEDED
Status: DISCONTINUED | OUTPATIENT
Start: 2023-01-30 | End: 2023-02-01 | Stop reason: HOSPADM

## 2023-01-30 RX ORDER — HYDROCORTISONE ACETATE PRAMOXINE HCL 2.5; 1 G/100G; G/100G
CREAM TOPICAL AS NEEDED
Status: DISCONTINUED | OUTPATIENT
Start: 2023-01-30 | End: 2023-02-01 | Stop reason: HOSPADM

## 2023-01-30 RX ORDER — SODIUM CHLORIDE 0.9 % (FLUSH) 0.9 %
5-40 SYRINGE (ML) INJECTION AS NEEDED
Status: DISCONTINUED | OUTPATIENT
Start: 2023-01-30 | End: 2023-02-01 | Stop reason: HOSPADM

## 2023-01-30 RX ORDER — BUTORPHANOL TARTRATE 1 MG/ML
1 INJECTION INTRAMUSCULAR; INTRAVENOUS
Status: DISCONTINUED | OUTPATIENT
Start: 2023-01-30 | End: 2023-01-30

## 2023-01-30 RX ADMIN — IBUPROFEN 800 MG: 800 TABLET, FILM COATED ORAL at 15:40

## 2023-01-30 RX ADMIN — HYDROCODONE BITARTRATE AND ACETAMINOPHEN 1 TABLET: 5; 325 TABLET ORAL at 15:40

## 2023-01-30 RX ADMIN — WITCH HAZEL 1 PAD: 500 SOLUTION RECTAL; TOPICAL at 21:48

## 2023-01-30 RX ADMIN — IBUPROFEN 800 MG: 800 TABLET, FILM COATED ORAL at 23:27

## 2023-01-30 NOTE — H&P
History & Physical    Name: Chelsie Valencia MRN: [id number]  SSN: [id number]    YOB: 1995  Age: 32 y.o. Sex: female        Subjective:     Estimated Date of Delivery: 23  OB History          4    Para   3    Term   3            AB   0    Living   3         SAB   0    IAB        Ectopic        Molar        Multiple        Live Births                    Ms. Sharlene Vargas is admitted with pregnancy at 40w5d for active labor. Prenatal course is unremarkable. Please see prenatal records for details. Past Medical History:   Diagnosis Date    Rhesus isoimmunization affecting pregnancy      Past Surgical History:   Procedure Laterality Date    HX HERNIA REPAIR  6315    umbilical    HX WISDOM TEETH EXTRACTION       Social History     Occupational History    Not on file   Tobacco Use    Smoking status: Never    Smokeless tobacco: Never   Substance and Sexual Activity    Alcohol use: No    Drug use: No    Sexual activity: Yes     Partners: Male     Birth control/protection: None     History reviewed. No pertinent family history. No Known Allergies  Prior to Admission medications    Medication Sig Start Date End Date Taking? Authorizing Provider   CHRIS DW.72/RGJUWSL FUM/FOLIC AC (PRENATAL PO) Take 1 Tab by mouth daily. Patient not taking: Reported on 2023    Provider, Historical        Review of Systems: Comprehensive review of systems was performed and is negative other than that contained in history of present illness.     Objective:     Vitals:  Vitals:    23 1304 23 1305 23 1314   BP:  130/81 120/66   Pulse:  (!) 108 80   Weight: 196 lb (88.9 kg)     Height: 5' 7\" (1.702 m)          Physical Exam:  Patient is a well-developed well-nourished female no apparent distress  She is alert and oriented x3  Heart is regular rate and rhythm  Lungs are clear  Abdomen is gravid  Extremities without clubbing cyanosis or edema  Cervix is 6 cm  Membranes:  Intact  Fetal Heart Rate: Fetal heart tones are in the 140s and reactive    Prenatal Labs:   Lab Results   Component Value Date/Time    Rubella, External Immune 06/20/2017 12:00 AM    GrBStrep, External negative 08/10/2017 12:00 AM    HBsAg, External negative 06/20/2017 12:00 AM    HIV, External negative 06/20/2017 12:00 AM    RPR, External non reactive 06/20/2017 12:00 AM    Gonorrhea, External negative 06/20/2017 12:00 AM    Chlamydia, External negative 06/20/2017 12:00 AM        Assessment/Plan:     Plan: Admit for labor. Group B Strep was negative.     Signed By:  Vero Mendez MD     January 30, 2023

## 2023-01-30 NOTE — PROGRESS NOTES
1255: Pt arrives via wheelchair from ED with complaints of contractions and leaking of fluid. States was seen by Dr. Tosin Matthews at [facility] today and was told she was 5 cm dilated but not effaced. States contractions started last night. Pt sent to br for gown and urine. 1305: Admission orders received. RN continuously at bedside. 1350: Dr. Modesto Ribeiro in room.

## 2023-01-30 NOTE — PROCEDURES
DELIVERY NOTE    PREOPERATIVE DIAGNOSIS: Intrauterine pregnancy at 39 weeks gestation    POSTOPERATIVE DIAGNOSIS: Intrauterine pregnancy at 39 weeks gestation delivered    PROCEDURE:  Spontaneous vaginal delivery  Amniotomy  Electronic fetal monitoring    ANESTHESIA: None    ANESTHESIOLOGIST: None    SURGEON: Gertrude    ASSISTANT:N/A    COMPLICATIONS: None    CONDITION DURING PROCEDURE: Stable    ESTIMATED BLOOD LOSS:200    SURGICAL COUNT:correct    SPECIMEN: None    FINDINGS: Male infant, cephalic presentation, GEORGETTE. Apgar's:. Weight: Skin-to-skin. Intact placenta. Three-vessel cord. DESCRIPTION OF PROCEDURE: The patient was admitted in active labor. She was found to be 6 cm at the time of admission. She progressed rapidly through a normal course of labor and delivered spontaneously in GEORGETTE position. The oropharynx was bulb suctioned at time of delivery. The cord is clamped and cut after 1 minute delay. The infant was handed to the mother for skin to skin. Patient was examined and noted to have intact perineum. There was good hemostasis postdelivery.

## 2023-01-31 PROCEDURE — 36415 COLL VENOUS BLD VENIPUNCTURE: CPT

## 2023-01-31 PROCEDURE — 65410000002 HC RM PRIVATE OB

## 2023-01-31 PROCEDURE — 74011250637 HC RX REV CODE- 250/637: Performed by: OBSTETRICS & GYNECOLOGY

## 2023-01-31 PROCEDURE — 86900 BLOOD TYPING SEROLOGIC ABO: CPT

## 2023-01-31 PROCEDURE — 74011250636 HC RX REV CODE- 250/636: Performed by: OBSTETRICS & GYNECOLOGY

## 2023-01-31 PROCEDURE — 85461 HEMOGLOBIN FETAL: CPT

## 2023-01-31 RX ADMIN — IBUPROFEN 800 MG: 800 TABLET, FILM COATED ORAL at 07:59

## 2023-01-31 RX ADMIN — HUMAN RHO(D) IMMUNE GLOBULIN 300 MCG: 1500 SOLUTION INTRAMUSCULAR; INTRAVENOUS at 08:05

## 2023-01-31 RX ADMIN — IBUPROFEN 800 MG: 800 TABLET, FILM COATED ORAL at 15:35

## 2023-01-31 NOTE — PROGRESS NOTES
Blood bank was called about the card to fill out for RhoGam and also the sheet of paper to fill out and place on patient's chart. Blood bank said they will send card to fill out but does not know about the sheet to place on patient's chart.

## 2023-01-31 NOTE — PROGRESS NOTES
Progress Note                               Patient: Shai Fonseca MRN: [id number]  SSN: [id number]    YOB: 1995  Age: 32 y.o. Sex: female      Postpartum Day Number 1    Subjective:     Patient doing well postpartum without significant complaints. Voiding without difficulty. Patient reports normal lochia. . Breastfeeding: Yes     Objective:     Patient Vitals for the past 18 hrs:   Temp Pulse Resp BP SpO2   23 1535 98.1 °F (36.7 °C) 89 16 111/67 100 %   23 0753 98.2 °F (36.8 °C) 67 16 108/64 100 %   23 2326 97.8 °F (36.6 °C) 82 16 (!) 99/56 100 %        Temp (24hrs), Av °F (36.7 °C), Min:97.8 °F (36.6 °C), Max:98.3 °F (36.8 °C)      Physical Exam:    General:   Patient without distress. Abdomen: Soft, fundus firm at level of umbilicus, nontender   Lower Extremities: Negative for swelling, cords, or tenderness. Lab/Data Review: All lab results for the last 24 hours reviewed. Assessment and Plan:     Patient appears to be having an uncomplicated postpartum course. Continue routine perineal care and maternal education.

## 2023-01-31 NOTE — PROGRESS NOTES
Problem: Vaginal Delivery: Postpartum Day 1  Goal: Activity/Safety  Outcome: Progressing Towards Goal  Goal: Consults, if ordered  Outcome: Progressing Towards Goal  Goal: Diagnostic Test/Procedures  Outcome: Progressing Towards Goal  Goal: Nutrition/Diet  Outcome: Progressing Towards Goal  Goal: Discharge Planning  Outcome: Progressing Towards Goal  Goal: Medications  Outcome: Progressing Towards Goal  Goal: Treatments/Interventions/Procedures  Outcome: Progressing Towards Goal  Goal: Psychosocial  Outcome: Progressing Towards Goal  Goal: *Vital signs within defined limits  Outcome: Progressing Towards Goal  Goal: *Labs within defined limits  Outcome: Progressing Towards Goal  Goal: *Hemodynamically stable  Outcome: Progressing Towards Goal  Goal: *Optimal pain control at patient's stated goal  Outcome: Progressing Towards Goal  Goal: *Participates in infant care  Outcome: Progressing Towards Goal  Goal: *Demonstrates progressive activity  Outcome: Progressing Towards Goal  Goal: *Performs self perineal care  Outcome: Progressing Towards Goal  Goal: *Appropriate parent-infant bonding  Outcome: Progressing Towards Goal  Goal: *Tolerating diet  Outcome: Progressing Towards Goal  Goal: *Performs self breast care  Outcome: Progressing Towards Goal     Problem: Vaginal Delivery: Postpartum 2  Goal: Activity/Safety  Outcome: Progressing Towards Goal  Goal: Consults, if ordered  Outcome: Progressing Towards Goal  Goal: Nutrition/Diet  Outcome: Progressing Towards Goal  Goal: Discharge Planning  Outcome: Progressing Towards Goal  Goal: Medications  Outcome: Progressing Towards Goal  Goal: Treatments/Interventions/Procedures  Outcome: Progressing Towards Goal  Goal: Psychosocial  Outcome: Progressing Towards Goal     Problem: Vaginal Delivery: Discharge Outcomes  Goal: *Verbalizes name, dosage, time, side effects, and number of days to continue medications  Outcome: Progressing Towards Goal  Goal: *Describes available resources and support systems  Outcome: Progressing Towards Goal  Goal: *No signs and symptoms of infection  Outcome: Progressing Towards Goal  Goal: *Birth certificate information completed  Outcome: Progressing Towards Goal  Goal: *Received and verbalizes understanding of discharge plan and instructions  Outcome: Progressing Towards Goal  Goal: *Vital signs within defined limits  Outcome: Progressing Towards Goal  Goal: *Labs within defined limits  Outcome: Progressing Towards Goal  Goal: *Hemodynamically stable  Outcome: Progressing Towards Goal  Goal: *Optimal pain control at patient's stated goal  Outcome: Progressing Towards Goal  Goal: *Participates in infant care  Outcome: Progressing Towards Goal  Goal: *Demonstrates progressive activity  Outcome: Progressing Towards Goal  Goal: *Appropriate parent-infant bonding  Outcome: Progressing Towards Goal  Goal: *Tolerating diet  Outcome: Progressing Towards Goal

## 2023-02-01 VITALS
DIASTOLIC BLOOD PRESSURE: 70 MMHG | BODY MASS INDEX: 30.76 KG/M2 | RESPIRATION RATE: 16 BRPM | HEART RATE: 100 BPM | TEMPERATURE: 97.4 F | WEIGHT: 196 LBS | HEIGHT: 67 IN | SYSTOLIC BLOOD PRESSURE: 119 MMHG | OXYGEN SATURATION: 97 %

## 2023-02-01 LAB
ABO + RH BLD: NORMAL
BLD PROD TYP BPU: NORMAL
BLD PROD TYP BPU: NORMAL
BPU ID: NORMAL
FETAL SCREEN,FMHS: NEGATIVE
NUM BPU REQUESTED: 1
STATUS OF UNIT,%ST: NORMAL
TRANSFUSION STATUS PATIENT QL: NORMAL
UNIT DIVISION, %UDIV: 0

## 2023-02-01 PROCEDURE — 74011250637 HC RX REV CODE- 250/637: Performed by: OBSTETRICS & GYNECOLOGY

## 2023-02-01 RX ORDER — HYDROCODONE BITARTRATE AND ACETAMINOPHEN 5; 325 MG/1; MG/1
1 TABLET ORAL
Qty: 20 TABLET | Refills: 0 | Status: SHIPPED | OUTPATIENT
Start: 2023-02-01 | End: 2023-02-06

## 2023-02-01 RX ORDER — IBUPROFEN 800 MG/1
800 TABLET ORAL EVERY 8 HOURS
Qty: 30 TABLET | Refills: 0 | Status: SHIPPED | OUTPATIENT
Start: 2023-02-01

## 2023-02-01 RX ADMIN — IBUPROFEN 800 MG: 800 TABLET, FILM COATED ORAL at 00:09

## 2023-02-01 RX ADMIN — IBUPROFEN 800 MG: 800 TABLET, FILM COATED ORAL at 09:41

## 2023-02-01 NOTE — DISCHARGE SUMMARY
[facility] Discharge Summary     Patient ID:  Patricia Banuelos  [id number]  32 y.o.  1995    Admit date: 2023    Discharge date and time: 23    Admitting Physician: Scooby Boo MD     Discharge Physician: Terence Camarena MD    Admission Diagnoses: Pregnant [Z34.90]    Hospital Course: Patircia Banuelos had unremarkeable progressive recovery. and Eating, ambulating, and voiding in a routine manner. Significant Diagnostic Studies: No results found for this or any previous visit (from the past 24 hour(s)). Procedures:      Discharge Exam:  Visit Vitals  /70 (BP 1 Location: Right upper arm, BP Patient Position: At rest;Sitting)   Pulse 100   Temp 97.4 °F (36.3 °C)   Resp 16   Ht 5' 7\" (1.702 m)   Wt 196 lb (88.9 kg)   SpO2 97%   Breastfeeding Unknown   BMI 30.70 kg/m²        Patient Instructions:   Current Discharge Medication List        CONTINUE these medications which have NOT CHANGED    Details   PNV KR.26/CPBMOFH FUM/FOLIC AC (PRENATAL PO) Take 1 Tab by mouth daily. Activity: physical activity is restricted per discharge instructions  Diet: resume normal diet  Wound Care: None needed  Disposition: Home  Follow-up in 6 weeks.     Signed:  Terence Camarena MD  2023  12:39 PM

## 2023-02-01 NOTE — PROGRESS NOTES
1610 Discharge instructions reviewed with patient. Patient aware Rx's were sent to pharmacy and aware when can take next dose. Aware of warning signs and when to notify MD.     Discharge plan of care/case management plan validated with provider discharge order. Patient discharged to main entrance via wheelchair in stable condition with baby in car seat.    Problem: Vaginal Delivery: Postpartum Day 1  Goal: Off Pathway (Use only if patient is Off Pathway)  Outcome: Resolved/Met  Goal: Activity/Safety  Outcome: Resolved/Met  Goal: Consults, if ordered  Outcome: Resolved/Met  Goal: Diagnostic Test/Procedures  Outcome: Resolved/Met  Goal: Nutrition/Diet  Outcome: Resolved/Met  Goal: Discharge Planning  Outcome: Resolved/Met  Goal: Medications  Outcome: Resolved/Met  Goal: Treatments/Interventions/Procedures  Outcome: Resolved/Met  Goal: Psychosocial  Outcome: Resolved/Met  Goal: *Vital signs within defined limits  Outcome: Resolved/Met  Goal: *Labs within defined limits  Outcome: Resolved/Met  Goal: *Hemodynamically stable  Outcome: Resolved/Met  Goal: *Optimal pain control at patient's stated goal  Outcome: Resolved/Met  Goal: *Participates in infant care  Outcome: Resolved/Met  Goal: *Demonstrates progressive activity  Outcome: Resolved/Met  Goal: *Performs self perineal care  Outcome: Resolved/Met  Goal: *Appropriate parent-infant bonding  Outcome: Resolved/Met  Goal: *Tolerating diet  Outcome: Resolved/Met  Goal: *Performs self breast care  Outcome: Resolved/Met     Problem: Vaginal Delivery: Postpartum 2  Goal: Off Pathway (Use only if patient is Off Pathway)  Outcome: Resolved/Met  Goal: Activity/Safety  2/1/2023 1619 by Sreekanth Carrion RN  Outcome: Resolved/Met  2/1/2023 0949 by Sreekanth Carrion RN  Outcome: Progressing Towards Goal  Goal: Consults, if ordered  2/1/2023 1619 by Sreekanth Carrion RN  Outcome: Resolved/Met  2/1/2023 0949 by Sreekanth Carrion RN  Outcome: Progressing Towards Goal  Goal: Nutrition/Diet  2/1/2023 1619 by Desmond Salgado RN  Outcome: Resolved/Met  2/1/2023 0949 by Desmond Salgado RN  Outcome: Progressing Towards Goal  Goal: Discharge Planning  2/1/2023 1619 by Desmond Salgado RN  Outcome: Resolved/Met  2/1/2023 0949 by Desmond Salgado RN  Outcome: Progressing Towards Goal  Goal: Medications  Outcome: Resolved/Met  Goal: Treatments/Interventions/Procedures  2/1/2023 1619 by Desmond Salgado RN  Outcome: Resolved/Met  2/1/2023 0949 by Desmond Salgado RN  Outcome: Progressing Towards Goal  Goal: Psychosocial  2/1/2023 1619 by Desmond Salgado RN  Outcome: Resolved/Met  2/1/2023 0949 by Desmond Salgado RN  Outcome: Progressing Towards Goal     Problem: Vaginal Delivery: Discharge Outcomes  Goal: *Verbalizes name, dosage, time, side effects, and number of days to continue medications  Outcome: Resolved/Met  Goal: *Describes available resources and support systems  Outcome: Resolved/Met  Goal: *No signs and symptoms of infection  Outcome: Resolved/Met  Goal: *Birth certificate information completed  Outcome: Resolved/Met  Goal: *Received and verbalizes understanding of discharge plan and instructions  Outcome: Resolved/Met  Goal: *Vital signs within defined limits  Outcome: Resolved/Met  Goal: *Labs within defined limits  Outcome: Resolved/Met  Goal: *Hemodynamically stable  Outcome: Resolved/Met  Goal: *Optimal pain control at patient's stated goal  Outcome: Resolved/Met  Goal: *Participates in infant care  Outcome: Resolved/Met  Goal: *Demonstrates progressive activity  Outcome: Resolved/Met  Goal: *Appropriate parent-infant bonding  Outcome: Resolved/Met  Goal: *Tolerating diet  Outcome: Resolved/Met

## 2025-03-19 ENCOUNTER — TELEPHONE (OUTPATIENT)
Age: 30
End: 2025-03-19

## 2025-03-19 NOTE — TELEPHONE ENCOUNTER
3/19/2025-You are allowed 2 guests to accompany you to your visit as long as they are over the age of 12. Children under the age of 12 are not allowed under any circumstances. Your appointment may be rescheduled if no one is available to be seated with them in the waiting room during your appointment. Please arrive 10-15 minutes ahead of your appointment to allot enough time to update any information in our system that we may need. If you arrive 15 minutes past your appointment time, we may ask you to reschedule.  ww

## 2025-04-18 DIAGNOSIS — O36.80X0 ENCOUNTER TO DETERMINE FETAL VIABILITY OF PREGNANCY, SINGLE OR UNSPECIFIED FETUS: Primary | ICD-10-CM

## 2025-04-22 ENCOUNTER — INITIAL PRENATAL (OUTPATIENT)
Age: 30
End: 2025-04-22

## 2025-04-22 VITALS
SYSTOLIC BLOOD PRESSURE: 125 MMHG | HEIGHT: 67 IN | BODY MASS INDEX: 33.14 KG/M2 | DIASTOLIC BLOOD PRESSURE: 72 MMHG | WEIGHT: 211.13 LBS

## 2025-04-22 DIAGNOSIS — Z11.51 SCREENING FOR HUMAN PAPILLOMAVIRUS: ICD-10-CM

## 2025-04-22 DIAGNOSIS — Z11.3 SCREENING FOR VENEREAL DISEASE: ICD-10-CM

## 2025-04-22 DIAGNOSIS — Z3A.23 23 WEEKS GESTATION OF PREGNANCY: Primary | ICD-10-CM

## 2025-04-22 DIAGNOSIS — Z36.89 SCREENING, ANTENATAL, FOR FETAL ANATOMIC SURVEY: Primary | ICD-10-CM

## 2025-04-22 DIAGNOSIS — O09.30 LATE PRENATAL CARE: ICD-10-CM

## 2025-04-22 PROCEDURE — 0500F INITIAL PRENATAL CARE VISIT: CPT | Performed by: OBSTETRICS & GYNECOLOGY

## 2025-04-22 NOTE — PROGRESS NOTES
Ketones:NEG // GLU:NEG //  Blo:NEG //  Pro:NEG //  Nit:NEG // Leuk: NEG   Pt presents as new prental with no prenatal care. Pt has no ob complaints//TOO LATE FOR AFP, OB PAP, OB PANEL TODAY, GLUCOLA NEXT//Adielley

## 2025-04-27 ENCOUNTER — RESULTS FOLLOW-UP (OUTPATIENT)
Facility: HOSPITAL | Age: 30
End: 2025-04-27

## 2025-04-27 LAB
., LABCORP: NORMAL
C TRACH RRNA CVX QL NAA+PROBE: NEGATIVE
CYTOLOGIST CVX/VAG CYTO: NORMAL
CYTOLOGY CVX/VAG DOC CYTO: NORMAL
CYTOLOGY CVX/VAG DOC THIN PREP: NORMAL
DX ICD CODE: NORMAL
N GONORRHOEA RRNA CVX QL NAA+PROBE: NEGATIVE
OTHER STN SPEC: NORMAL
SERVICE CMNT-IMP: NORMAL
STAT OF ADQ CVX/VAG CYTO-IMP: NORMAL
T VAGINALIS RRNA SPEC QL NAA+PROBE: NEGATIVE

## 2025-04-29 SDOH — ECONOMIC STABILITY: TRANSPORTATION INSECURITY
IN THE PAST 12 MONTHS, HAS LACK OF TRANSPORTATION KEPT YOU FROM MEETINGS, WORK, OR FROM GETTING THINGS NEEDED FOR DAILY LIVING?: PATIENT DECLINED

## 2025-04-29 SDOH — ECONOMIC STABILITY: FOOD INSECURITY: WITHIN THE PAST 12 MONTHS, THE FOOD YOU BOUGHT JUST DIDN'T LAST AND YOU DIDN'T HAVE MONEY TO GET MORE.: OFTEN TRUE

## 2025-04-29 SDOH — ECONOMIC STABILITY: INCOME INSECURITY: IN THE LAST 12 MONTHS, WAS THERE A TIME WHEN YOU WERE NOT ABLE TO PAY THE MORTGAGE OR RENT ON TIME?: YES

## 2025-04-29 SDOH — ECONOMIC STABILITY: TRANSPORTATION INSECURITY
IN THE PAST 12 MONTHS, HAS THE LACK OF TRANSPORTATION KEPT YOU FROM MEDICAL APPOINTMENTS OR FROM GETTING MEDICATIONS?: YES

## 2025-04-29 SDOH — ECONOMIC STABILITY: FOOD INSECURITY: WITHIN THE PAST 12 MONTHS, YOU WORRIED THAT YOUR FOOD WOULD RUN OUT BEFORE YOU GOT MONEY TO BUY MORE.: OFTEN TRUE

## 2025-04-30 ENCOUNTER — ROUTINE PRENATAL (OUTPATIENT)
Age: 30
End: 2025-04-30

## 2025-04-30 VITALS
DIASTOLIC BLOOD PRESSURE: 58 MMHG | HEIGHT: 67 IN | BODY MASS INDEX: 33.48 KG/M2 | SYSTOLIC BLOOD PRESSURE: 134 MMHG | WEIGHT: 213.31 LBS

## 2025-04-30 DIAGNOSIS — N76.0 VAGINITIS AND VULVOVAGINITIS: ICD-10-CM

## 2025-04-30 DIAGNOSIS — N76.0 ACUTE VAGINITIS: Primary | ICD-10-CM

## 2025-04-30 DIAGNOSIS — Z3A.24 24 WEEKS GESTATION OF PREGNANCY: ICD-10-CM

## 2025-04-30 RX ORDER — TERCONAZOLE 4 MG/G
1 CREAM VAGINAL NIGHTLY
Qty: 1 EACH | Refills: 0 | Status: SHIPPED | OUTPATIENT
Start: 2025-04-30 | End: 2025-05-07

## 2025-04-30 NOTE — PROGRESS NOTES
Ketones:NEG // GLU:NEG //  Blo:NEG //  Pro:NEG //  Nit:NEG // Leuk: NEG   OB PROBLEM: Pt presents with complaints of vaginal itching and irritation//NUSWAB PLUS TODAY//Jad

## 2025-04-30 NOTE — PROGRESS NOTES
Leah Bourgeois is a 29 y.o. female who presents today for the following:  Chief Complaint   Patient presents with    Pregnancy Problem        No Known Allergies    Current Outpatient Medications   Medication Sig Dispense Refill    terconazole (TERAZOL 7) 0.4 % vaginal cream Place 1 applicator vaginally nightly for 7 days Place vaginally nightly. 1 each 0     No current facility-administered medications for this visit.       Past Medical History:   Diagnosis Date    Rhesus isoimmunization affecting pregnancy        Past Surgical History:   Procedure Laterality Date    HERNIA REPAIR  2019    umbilical    WISDOM TOOTH EXTRACTION         No family history on file.    Social History     Socioeconomic History    Marital status: Single     Spouse name: Not on file    Number of children: Not on file    Years of education: Not on file    Highest education level: Not on file   Occupational History    Not on file   Tobacco Use    Smoking status: Never    Smokeless tobacco: Never   Substance and Sexual Activity    Alcohol use: No    Drug use: No    Sexual activity: Yes     Birth control/protection: None   Other Topics Concern    Not on file   Social History Narrative    Not on file     Social Drivers of Health     Financial Resource Strain: Not on file   Food Insecurity: Not on file (4/29/2025)   Transportation Needs: Not on file   Physical Activity: Not on file   Stress: Not on file   Social Connections: Not on file   Intimate Partner Violence: Not on file   Housing Stability: Unknown (4/29/2025)    Housing Stability Vital Sign     Unable to Pay for Housing in the Last Year: Not on file     Number of Times Moved in the Last Year: 0     Homeless in the Last Year: No         Review of Systems     Review of Systems   Constitutional: Negative.    HENT: Negative.    Eyes: Negative.    Respiratory: Negative.    Cardiovascular: Negative.    Gastrointestinal: Negative.    Genitourinary: Negative.    Musculoskeletal: Negative.    Skin:

## 2025-05-04 LAB
A VAGINAE DNA VAG QL NAA+PROBE: ABNORMAL SCORE
BVAB2 DNA VAG QL NAA+PROBE: ABNORMAL SCORE
C ALBICANS DNA VAG QL NAA+PROBE: POSITIVE
C GLABRATA DNA VAG QL NAA+PROBE: NEGATIVE
C KRUSEI DNA VAG QL NAA+PROBE: NEGATIVE
C LUSITANIAE DNA VAG QL NAA+PROBE: NEGATIVE
C TRACH DNA SPEC QL NAA+PROBE: NEGATIVE
CANDIDA DNA VAG QL NAA+PROBE: NEGATIVE
MEGA1 DNA VAG QL NAA+PROBE: ABNORMAL SCORE
N GONORRHOEA DNA VAG QL NAA+PROBE: NEGATIVE
T VAGINALIS DNA VAG QL NAA+PROBE: NEGATIVE

## 2025-05-05 ENCOUNTER — RESULTS FOLLOW-UP (OUTPATIENT)
Age: 30
End: 2025-05-05

## 2025-05-05 DIAGNOSIS — B96.89 BACTERIAL VAGINOSIS IN PREGNANCY: Primary | ICD-10-CM

## 2025-05-05 DIAGNOSIS — O23.599 BACTERIAL VAGINOSIS IN PREGNANCY: Primary | ICD-10-CM

## 2025-05-05 RX ORDER — METRONIDAZOLE 500 MG/1
500 TABLET ORAL 2 TIMES DAILY
Qty: 14 TABLET | Refills: 0 | Status: SHIPPED | OUTPATIENT
Start: 2025-05-05 | End: 2025-05-12

## 2025-05-23 ENCOUNTER — ROUTINE PRENATAL (OUTPATIENT)
Age: 30
End: 2025-05-23

## 2025-05-23 VITALS
SYSTOLIC BLOOD PRESSURE: 113 MMHG | BODY MASS INDEX: 33.29 KG/M2 | DIASTOLIC BLOOD PRESSURE: 67 MMHG | WEIGHT: 212.13 LBS | HEIGHT: 67 IN

## 2025-05-23 DIAGNOSIS — Z67.91 RH NEGATIVE STATE IN ANTEPARTUM PERIOD, SECOND TRIMESTER: ICD-10-CM

## 2025-05-23 DIAGNOSIS — F32.9 REACTIVE DEPRESSION: ICD-10-CM

## 2025-05-23 DIAGNOSIS — F41.9 ANXIETY: ICD-10-CM

## 2025-05-23 DIAGNOSIS — O26.892 RH NEGATIVE STATE IN ANTEPARTUM PERIOD, SECOND TRIMESTER: ICD-10-CM

## 2025-05-23 DIAGNOSIS — Z3A.27 27 WEEKS GESTATION OF PREGNANCY: ICD-10-CM

## 2025-05-23 DIAGNOSIS — Z13.1 DIABETES MELLITUS SCREENING: Primary | ICD-10-CM

## 2025-05-23 DIAGNOSIS — Z67.11 TYPE A BLOOD, RH NEGATIVE: Primary | ICD-10-CM

## 2025-05-23 RX ORDER — ALPRAZOLAM 0.5 MG
0.25 TABLET ORAL 2 TIMES DAILY PRN
Qty: 15 TABLET | Refills: 0 | Status: SHIPPED | OUTPATIENT
Start: 2025-05-23 | End: 2025-06-22
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
## 2025-05-23 NOTE — PROGRESS NOTES
Ketones:NEG // GLU:NEG //  Blo:NEG //  Pro:NEG //  Nit:NEG // Leuk: NEG   Pt presents with no new ob complaints//GLUCOLA, ANTIBODY TODAY, RHOGAM NEXT//Adielley

## 2025-05-24 LAB
BLD GP AB SCN SERPL QL: NEGATIVE
GLUCOSE 1H P 50 G GLC PO SERPL-MCNC: 117 MG/DL (ref 70–139)

## 2025-05-25 ENCOUNTER — RESULTS FOLLOW-UP (OUTPATIENT)
Age: 30
End: 2025-05-25

## 2025-05-29 DIAGNOSIS — Z67.11 TYPE A BLOOD, RH NEGATIVE: ICD-10-CM

## 2025-05-29 NOTE — TELEPHONE ENCOUNTER
Attempted to contact the patient regarding the need for her to contact [facility] to authorize shipment of her Rhogam.  She did not answer and her voicemail is not set up.

## 2025-05-30 NOTE — TELEPHONE ENCOUNTER
Spoke with the patient and advised her to contact [facility] to authorize shipment of Rhogam to our office and contact number was given.

## 2025-06-06 ENCOUNTER — CLINICAL SUPPORT (OUTPATIENT)
Age: 30
End: 2025-06-06

## 2025-06-06 ENCOUNTER — ROUTINE PRENATAL (OUTPATIENT)
Age: 30
End: 2025-06-06

## 2025-06-06 VITALS — DIASTOLIC BLOOD PRESSURE: 76 MMHG | HEART RATE: 107 BPM | SYSTOLIC BLOOD PRESSURE: 110 MMHG

## 2025-06-06 VITALS
BODY MASS INDEX: 32.87 KG/M2 | DIASTOLIC BLOOD PRESSURE: 81 MMHG | SYSTOLIC BLOOD PRESSURE: 150 MMHG | WEIGHT: 209.44 LBS | HEIGHT: 67 IN

## 2025-06-06 DIAGNOSIS — Z67.11 TYPE A BLOOD, RH NEGATIVE: Primary | ICD-10-CM

## 2025-06-06 DIAGNOSIS — N76.0 VAGINITIS AND VULVOVAGINITIS: ICD-10-CM

## 2025-06-06 DIAGNOSIS — Z3A.29 29 WEEKS GESTATION OF PREGNANCY: ICD-10-CM

## 2025-06-06 DIAGNOSIS — O16.2 HYPERTENSION DURING PREGNANCY IN SECOND TRIMESTER, UNSPECIFIED HYPERTENSION IN PREGNANCY TYPE: Primary | ICD-10-CM

## 2025-06-06 RX ORDER — METRONIDAZOLE 500 MG/1
500 TABLET ORAL 2 TIMES DAILY
Qty: 14 TABLET | Refills: 0 | Status: SHIPPED | OUTPATIENT
Start: 2025-06-06 | End: 2025-06-13

## 2025-06-06 SDOH — ECONOMIC STABILITY: FOOD INSECURITY: WITHIN THE PAST 12 MONTHS, THE FOOD YOU BOUGHT JUST DIDN'T LAST AND YOU DIDN'T HAVE MONEY TO GET MORE.: NEVER TRUE

## 2025-06-06 SDOH — ECONOMIC STABILITY: FOOD INSECURITY: WITHIN THE PAST 12 MONTHS, YOU WORRIED THAT YOUR FOOD WOULD RUN OUT BEFORE YOU GOT MONEY TO BUY MORE.: NEVER TRUE

## 2025-06-06 NOTE — PROGRESS NOTES
Ketones:NEG // GLU:NEG //  Blo:NEG //  Pro:NEG //  Nit:NEG // Leuk: NEG   Pt presents with complaints of tru garcia contractions and vaginal discharge still//RHOGAM TODAY, GLUCOLA WNL//Adielley

## 2025-06-06 NOTE — PROGRESS NOTES
DEPRESSION BETTER, NOT TAKING MEDS  RHOGAM TODAY  GTT NL  ELEVATED BP, WILL DO PIH LABS AND RTC IN 1 WEEK  RX FLAGYL FOR BV

## 2025-06-07 LAB
ALBUMIN SERPL-MCNC: 3.7 G/DL (ref 4–5)
ALP SERPL-CCNC: 87 IU/L (ref 44–121)
ALT SERPL-CCNC: 15 IU/L (ref 0–32)
AST SERPL-CCNC: 15 IU/L (ref 0–40)
BASOPHILS # BLD AUTO: 0 X10E3/UL (ref 0–0.2)
BASOPHILS NFR BLD AUTO: 0 %
BILIRUB SERPL-MCNC: 0.7 MG/DL (ref 0–1.2)
BUN SERPL-MCNC: 9 MG/DL (ref 6–20)
BUN/CREAT SERPL: 16 (ref 9–23)
CALCIUM SERPL-MCNC: 9.1 MG/DL (ref 8.7–10.2)
CHLORIDE SERPL-SCNC: 103 MMOL/L (ref 96–106)
CO2 SERPL-SCNC: 22 MMOL/L (ref 20–29)
CREAT SERPL-MCNC: 0.57 MG/DL (ref 0.57–1)
CREAT UR-MCNC: 203.1 MG/DL
EGFRCR SERPLBLD CKD-EPI 2021: 126 ML/MIN/1.73
EOSINOPHIL # BLD AUTO: 0 X10E3/UL (ref 0–0.4)
EOSINOPHIL NFR BLD AUTO: 0 %
ERYTHROCYTE [DISTWIDTH] IN BLOOD BY AUTOMATED COUNT: 11.8 % (ref 11.7–15.4)
GLOBULIN SER CALC-MCNC: 2.1 G/DL (ref 1.5–4.5)
GLUCOSE SERPL-MCNC: 92 MG/DL (ref 70–99)
HCT VFR BLD AUTO: 36 % (ref 34–46.6)
HGB BLD-MCNC: 11.6 G/DL (ref 11.1–15.9)
IMM GRANULOCYTES # BLD AUTO: 0.1 X10E3/UL (ref 0–0.1)
IMM GRANULOCYTES NFR BLD AUTO: 1 %
LYMPHOCYTES # BLD AUTO: 1.7 X10E3/UL (ref 0.7–3.1)
LYMPHOCYTES NFR BLD AUTO: 17 %
MCH RBC QN AUTO: 29.9 PG (ref 26.6–33)
MCHC RBC AUTO-ENTMCNC: 32.2 G/DL (ref 31.5–35.7)
MCV RBC AUTO: 93 FL (ref 79–97)
MONOCYTES # BLD AUTO: 0.7 X10E3/UL (ref 0.1–0.9)
MONOCYTES NFR BLD AUTO: 6 %
NEUTROPHILS # BLD AUTO: 7.8 X10E3/UL (ref 1.4–7)
NEUTROPHILS NFR BLD AUTO: 76 %
PLATELET # BLD AUTO: 287 X10E3/UL (ref 150–450)
POTASSIUM SERPL-SCNC: 3.8 MMOL/L (ref 3.5–5.2)
PROT SERPL-MCNC: 5.8 G/DL (ref 6–8.5)
PROT UR-MCNC: 23.2 MG/DL
PROT/CREAT UR: 114 MG/G CREAT (ref 0–200)
RBC # BLD AUTO: 3.88 X10E6/UL (ref 3.77–5.28)
SODIUM SERPL-SCNC: 137 MMOL/L (ref 134–144)
URATE SERPL-MCNC: 3.9 MG/DL (ref 2.6–6.2)
WBC # BLD AUTO: 10.4 X10E3/UL (ref 3.4–10.8)

## 2025-06-13 ENCOUNTER — ROUTINE PRENATAL (OUTPATIENT)
Age: 30
End: 2025-06-13

## 2025-06-13 VITALS
DIASTOLIC BLOOD PRESSURE: 70 MMHG | BODY MASS INDEX: 32.97 KG/M2 | HEIGHT: 67 IN | WEIGHT: 210.06 LBS | SYSTOLIC BLOOD PRESSURE: 105 MMHG

## 2025-06-13 DIAGNOSIS — Z3A.30 30 WEEKS GESTATION OF PREGNANCY: Primary | ICD-10-CM

## 2025-06-13 PROCEDURE — 0502F SUBSEQUENT PRENATAL CARE: CPT | Performed by: OBSTETRICS & GYNECOLOGY

## 2025-06-13 NOTE — PROGRESS NOTES
Ketones:NEG // GLU:NEG //  Blo:NEG //  Pro:NEG //  Nit:NEG // Leuk: NEG   Pt presents with no new ob complaints//GLUCOLA WNL//Jad

## 2025-07-09 ENCOUNTER — ROUTINE PRENATAL (OUTPATIENT)
Age: 30
End: 2025-07-09

## 2025-07-09 VITALS
BODY MASS INDEX: 35.07 KG/M2 | DIASTOLIC BLOOD PRESSURE: 70 MMHG | HEIGHT: 67 IN | WEIGHT: 223.44 LBS | SYSTOLIC BLOOD PRESSURE: 105 MMHG

## 2025-07-09 DIAGNOSIS — Z3A.34 34 WEEKS GESTATION OF PREGNANCY: Primary | ICD-10-CM

## 2025-07-09 NOTE — PROGRESS NOTES
Ketones:NEG // GLU:NEG //  Blo:NEG //  Pro:NEG //  Nit:NEG // Leuk: NEG   Pt presents with no new ob complaints//[id number] WNL//Jad

## 2025-07-23 ENCOUNTER — ROUTINE PRENATAL (OUTPATIENT)
Age: 30
End: 2025-07-23

## 2025-07-23 VITALS
HEIGHT: 67 IN | SYSTOLIC BLOOD PRESSURE: 138 MMHG | BODY MASS INDEX: 35.18 KG/M2 | DIASTOLIC BLOOD PRESSURE: 82 MMHG | WEIGHT: 224.13 LBS

## 2025-07-23 DIAGNOSIS — Z3A.36 36 WEEKS GESTATION OF PREGNANCY: Primary | ICD-10-CM

## 2025-07-23 DIAGNOSIS — N76.0 ACUTE VAGINITIS: ICD-10-CM

## 2025-07-23 DIAGNOSIS — Z36.85 ANTENATAL SCREENING FOR STREPTOCOCCUS B: ICD-10-CM

## 2025-07-23 PROCEDURE — 0502F SUBSEQUENT PRENATAL CARE: CPT | Performed by: OBSTETRICS & GYNECOLOGY

## 2025-07-23 RX ORDER — TERCONAZOLE 4 MG/G
1 CREAM VAGINAL NIGHTLY
Qty: 1 EACH | Refills: 1 | Status: SHIPPED | OUTPATIENT
Start: 2025-07-23 | End: 2025-07-30

## 2025-07-23 NOTE — PROGRESS NOTES
Ketones:NEG // GLU:NEG //  Blo:NEG //  Pro:NEG //  Nit:NEG // Leuk: NEG   Pt presents with complaints of vaginal itching and irritation//GBS TODAY, NUSWAB TODAY//Jad

## 2025-07-23 NOTE — PROGRESS NOTES
DOING WELL   X 4  GBS TODAY  C/O VAG DC, RX TERAZOL 7  1 CM  PT DESIRE TO DISCUSS IOL  WILL SCHEDULE IOL AT 39 WEEKS

## 2025-07-25 LAB — GP B STREP DNA SPEC QL NAA+PROBE: POSITIVE

## 2025-07-30 ENCOUNTER — TELEPHONE (OUTPATIENT)
Age: 30
End: 2025-07-30

## 2025-07-30 ENCOUNTER — ROUTINE PRENATAL (OUTPATIENT)
Age: 30
End: 2025-07-30

## 2025-07-30 VITALS
HEIGHT: 67 IN | SYSTOLIC BLOOD PRESSURE: 123 MMHG | BODY MASS INDEX: 35.34 KG/M2 | WEIGHT: 225.13 LBS | DIASTOLIC BLOOD PRESSURE: 79 MMHG

## 2025-07-30 DIAGNOSIS — Z34.83 PRENATAL CARE, SUBSEQUENT PREGNANCY, THIRD TRIMESTER: Primary | ICD-10-CM

## 2025-07-30 DIAGNOSIS — Z3A.37 37 WEEKS GESTATION OF PREGNANCY: ICD-10-CM

## 2025-07-30 PROCEDURE — 0502F SUBSEQUENT PRENATAL CARE: CPT | Performed by: OBSTETRICS & GYNECOLOGY

## 2025-07-30 NOTE — PROGRESS NOTES
Ketones:NEG // GLU:NEG //  Blo:NEG //  Pro:NEG //  Nit:NEG // Leuk: NEG   Pt presents with no new ob complaints//GBS POS//Adielley

## 2025-07-30 NOTE — PROGRESS NOTES
29 y.o.   at 37w2d, BTG: A Negative       Current Outpatient Medications:     terconazole (TERAZOL 7) 0.4 % vaginal cream, Place 1 applicator vaginally nightly for 7 days Place vaginally nightly., Disp: 1 each, Rfl: 1    naproxen (NAPROSYN) 500 MG tablet, Take 1 tablet by mouth 2 times daily (with meals), Disp: , Rfl:     Prenatal Vit w/Gb-Ewcybvpvo-SQ (PNV PO), Take 1 tablet by mouth daily, Disp: , Rfl:     Rho,D, immune globulin (HYPERRHO;RHOGAM) 1500 units SOSY injection, Inject 1500 units IM once, Disp: 1 each, Rfl: 0    Complains of: Patient is doing well, has no complaints.    1. Prenatal care, subsequent pregnancy, third trimester      2. 37-2/7 weeks gestation of pregnancy          Return in about 1 week (around 2025) for SALINA Garcia M.D.  [facility]  Obstetrics and Gynecology  [phone]

## 2025-07-30 NOTE — TELEPHONE ENCOUNTER
7/30/25   CALLED PATIENT TO SCHEDULE HER 1 WEEK JALEEL WITH DR. DEL VALLE. PATIENT DIDN'T ANSWER, NOT ABLE TO LEAVE A VOICEMAIL..

## 2025-08-06 ENCOUNTER — ROUTINE PRENATAL (OUTPATIENT)
Age: 30
End: 2025-08-06

## 2025-08-06 VITALS
DIASTOLIC BLOOD PRESSURE: 79 MMHG | HEIGHT: 67 IN | SYSTOLIC BLOOD PRESSURE: 136 MMHG | BODY MASS INDEX: 35.07 KG/M2 | WEIGHT: 223.44 LBS

## 2025-08-06 DIAGNOSIS — Z3A.38 38 WEEKS GESTATION OF PREGNANCY: Primary | ICD-10-CM

## 2025-08-06 PROCEDURE — 0502F SUBSEQUENT PRENATAL CARE: CPT | Performed by: OBSTETRICS & GYNECOLOGY

## 2025-08-11 ENCOUNTER — HOSPITAL ENCOUNTER (INPATIENT)
Facility: HOSPITAL | Age: 30
LOS: 3 days | Discharge: HOME | DRG: 540 | End: 2025-08-14
Attending: OBSTETRICS & GYNECOLOGY | Admitting: OBSTETRICS & GYNECOLOGY
Payer: MEDICAID

## 2025-08-11 PROBLEM — Z3A.39 39 WEEKS GESTATION OF PREGNANCY: Status: ACTIVE | Noted: 2025-08-11

## 2025-08-11 LAB
ABO + RH BLD: NORMAL
ALBUMIN SERPL-MCNC: 2.8 G/DL (ref 3.5–5)
ALBUMIN/GLOB SERPL: 0.8 (ref 1.1–2.2)
ALP SERPL-CCNC: 138 U/L (ref 45–117)
ALT SERPL-CCNC: 20 U/L (ref 12–78)
AMPHET UR QL SCN: NEGATIVE
ANION GAP SERPL CALC-SCNC: 9 MMOL/L (ref 2–12)
AST SERPL W P-5'-P-CCNC: 16 U/L (ref 15–37)
BARBITURATES UR QL SCN: NEGATIVE
BASOPHILS # BLD: 0.05 K/UL (ref 0–0.1)
BASOPHILS NFR BLD: 0.4 % (ref 0–1)
BENZODIAZ UR QL: NEGATIVE
BILIRUB SERPL-MCNC: 0.7 MG/DL (ref 0.2–1)
BLOOD GROUP ANTIBODIES SERPL: NEGATIVE
BUN SERPL-MCNC: 8 MG/DL (ref 6–20)
BUN/CREAT SERPL: 14 (ref 12–20)
CA-I BLD-MCNC: 9.3 MG/DL (ref 8.5–10.1)
CANNABINOIDS UR QL SCN: POSITIVE
CHLORIDE SERPL-SCNC: 109 MMOL/L (ref 97–108)
CO2 SERPL-SCNC: 22 MMOL/L (ref 21–32)
COCAINE UR QL SCN: NEGATIVE
CREAT SERPL-MCNC: 0.57 MG/DL (ref 0.55–1.02)
DIFFERENTIAL METHOD BLD: ABNORMAL
EOSINOPHIL # BLD: 0.08 K/UL (ref 0–0.4)
EOSINOPHIL NFR BLD: 0.7 % (ref 0–7)
ERYTHROCYTE [DISTWIDTH] IN BLOOD BY AUTOMATED COUNT: 13.8 % (ref 11.5–14.5)
GLOBULIN SER CALC-MCNC: 3.7 G/DL (ref 2–4)
GLUCOSE SERPL-MCNC: 87 MG/DL (ref 65–100)
HCT VFR BLD AUTO: 34.7 % (ref 35–47)
HGB BLD-MCNC: 11.3 G/DL (ref 11.5–16)
IMM GRANULOCYTES # BLD AUTO: 0.19 K/UL (ref 0–0.04)
IMM GRANULOCYTES NFR BLD AUTO: 1.6 % (ref 0–0.5)
LYMPHOCYTES # BLD: 1.89 K/UL (ref 0.8–3.5)
LYMPHOCYTES NFR BLD: 16.2 % (ref 12–49)
Lab: ABNORMAL
MCH RBC QN AUTO: 27.5 PG (ref 26–34)
MCHC RBC AUTO-ENTMCNC: 32.6 G/DL (ref 30–36.5)
MCV RBC AUTO: 84.4 FL (ref 80–99)
METHADONE UR QL: NEGATIVE
MONOCYTES # BLD: 1.09 K/UL (ref 0–1)
MONOCYTES NFR BLD: 9.4 % (ref 5–13)
NEUTS SEG # BLD: 8.34 K/UL (ref 1.8–8)
NEUTS SEG NFR BLD: 71.7 % (ref 32–75)
NRBC # BLD: 0 K/UL (ref 0–0.01)
NRBC BLD-RTO: 0 PER 100 WBC
OPIATES UR QL: NEGATIVE
PCP UR QL: NEGATIVE
PLATELET # BLD AUTO: 298 K/UL (ref 150–400)
PMV BLD AUTO: 10.5 FL (ref 8.9–12.9)
POTASSIUM SERPL-SCNC: 3.5 MMOL/L (ref 3.5–5.1)
PROT SERPL-MCNC: 6.5 G/DL (ref 6.4–8.2)
RBC # BLD AUTO: 4.11 M/UL (ref 3.8–5.2)
SODIUM SERPL-SCNC: 140 MMOL/L (ref 136–145)
SPECIMEN EXP DATE BLD: NORMAL
WBC # BLD AUTO: 11.6 K/UL (ref 3.6–11)

## 2025-08-11 PROCEDURE — 2580000003 HC RX 258: Performed by: OBSTETRICS & GYNECOLOGY

## 2025-08-11 PROCEDURE — 80053 COMPREHEN METABOLIC PANEL: CPT

## 2025-08-11 PROCEDURE — 6370000000 HC RX 637 (ALT 250 FOR IP): Performed by: OBSTETRICS & GYNECOLOGY

## 2025-08-11 PROCEDURE — 1120000000 HC RM PRIVATE OB

## 2025-08-11 PROCEDURE — 86803 HEPATITIS C AB TEST: CPT

## 2025-08-11 PROCEDURE — 6360000002 HC RX W HCPCS: Performed by: OBSTETRICS & GYNECOLOGY

## 2025-08-11 PROCEDURE — 36415 COLL VENOUS BLD VENIPUNCTURE: CPT

## 2025-08-11 PROCEDURE — 87389 HIV-1 AG W/HIV-1&-2 AB AG IA: CPT

## 2025-08-11 PROCEDURE — 80307 DRUG TEST PRSMV CHEM ANLYZR: CPT

## 2025-08-11 PROCEDURE — 2500000003 HC RX 250 WO HCPCS: Performed by: OBSTETRICS & GYNECOLOGY

## 2025-08-11 RX ORDER — ZOLPIDEM TARTRATE 5 MG/1
5 TABLET ORAL NIGHTLY PRN
Status: DISCONTINUED | OUTPATIENT
Start: 2025-08-11 | End: 2025-08-12

## 2025-08-11 RX ORDER — TERBUTALINE SULFATE 1 MG/ML
0.25 INJECTION SUBCUTANEOUS
Status: DISCONTINUED | OUTPATIENT
Start: 2025-08-11 | End: 2025-08-12

## 2025-08-11 RX ORDER — CARBOPROST TROMETHAMINE 250 UG/ML
250 INJECTION, SOLUTION INTRAMUSCULAR PRN
Status: DISCONTINUED | OUTPATIENT
Start: 2025-08-11 | End: 2025-08-12

## 2025-08-11 RX ORDER — SODIUM CHLORIDE 0.9 % (FLUSH) 0.9 %
5-40 SYRINGE (ML) INJECTION PRN
Status: DISCONTINUED | OUTPATIENT
Start: 2025-08-11 | End: 2025-08-12

## 2025-08-11 RX ORDER — FENTANYL CITRATE 50 UG/ML
50 INJECTION, SOLUTION INTRAMUSCULAR; INTRAVENOUS
Status: DISCONTINUED | OUTPATIENT
Start: 2025-08-11 | End: 2025-08-12

## 2025-08-11 RX ORDER — SODIUM CHLORIDE, SODIUM LACTATE, POTASSIUM CHLORIDE, CALCIUM CHLORIDE 600; 310; 30; 20 MG/100ML; MG/100ML; MG/100ML; MG/100ML
INJECTION, SOLUTION INTRAVENOUS CONTINUOUS
Status: DISCONTINUED | OUTPATIENT
Start: 2025-08-12 | End: 2025-08-12

## 2025-08-11 RX ORDER — ONDANSETRON 2 MG/ML
4 INJECTION INTRAMUSCULAR; INTRAVENOUS EVERY 6 HOURS PRN
Status: DISCONTINUED | OUTPATIENT
Start: 2025-08-11 | End: 2025-08-12

## 2025-08-11 RX ORDER — SODIUM CHLORIDE 0.9 % (FLUSH) 0.9 %
5-40 SYRINGE (ML) INJECTION EVERY 12 HOURS SCHEDULED
Status: DISCONTINUED | OUTPATIENT
Start: 2025-08-11 | End: 2025-08-12

## 2025-08-11 RX ORDER — ACETAMINOPHEN 500 MG
1000 TABLET ORAL EVERY 6 HOURS PRN
Status: DISCONTINUED | OUTPATIENT
Start: 2025-08-11 | End: 2025-08-12

## 2025-08-11 RX ORDER — LOPERAMIDE HYDROCHLORIDE 2 MG/1
2 CAPSULE ORAL PRN
Status: DISCONTINUED | OUTPATIENT
Start: 2025-08-11 | End: 2025-08-12

## 2025-08-11 RX ORDER — MISOPROSTOL 200 UG/1
400 TABLET ORAL PRN
Status: DISCONTINUED | OUTPATIENT
Start: 2025-08-11 | End: 2025-08-12

## 2025-08-11 RX ORDER — SODIUM CHLORIDE, SODIUM LACTATE, POTASSIUM CHLORIDE, AND CALCIUM CHLORIDE .6; .31; .03; .02 G/100ML; G/100ML; G/100ML; G/100ML
500 INJECTION, SOLUTION INTRAVENOUS PRN
Status: DISCONTINUED | OUTPATIENT
Start: 2025-08-11 | End: 2025-08-12

## 2025-08-11 RX ORDER — METHYLERGONOVINE MALEATE 0.2 MG/ML
200 INJECTION INTRAVENOUS PRN
Status: DISCONTINUED | OUTPATIENT
Start: 2025-08-11 | End: 2025-08-12

## 2025-08-11 RX ORDER — ONDANSETRON 4 MG/1
4 TABLET, ORALLY DISINTEGRATING ORAL EVERY 8 HOURS PRN
Status: DISCONTINUED | OUTPATIENT
Start: 2025-08-11 | End: 2025-08-12

## 2025-08-11 RX ORDER — PENICILLIN G 3000000 [IU]/50ML
3 INJECTION, SOLUTION INTRAVENOUS EVERY 4 HOURS
Status: DISCONTINUED | OUTPATIENT
Start: 2025-08-12 | End: 2025-08-12

## 2025-08-11 RX ORDER — DOCUSATE SODIUM 100 MG/1
100 CAPSULE, LIQUID FILLED ORAL 2 TIMES DAILY PRN
Status: DISCONTINUED | OUTPATIENT
Start: 2025-08-11 | End: 2025-08-12

## 2025-08-11 RX ORDER — SODIUM CHLORIDE 9 MG/ML
25 INJECTION, SOLUTION INTRAVENOUS PRN
Status: DISCONTINUED | OUTPATIENT
Start: 2025-08-11 | End: 2025-08-12

## 2025-08-11 RX ADMIN — SODIUM CHLORIDE, SODIUM LACTATE, POTASSIUM CHLORIDE, AND CALCIUM CHLORIDE: .6; .31; .03; .02 INJECTION, SOLUTION INTRAVENOUS at 23:43

## 2025-08-11 RX ADMIN — ACETAMINOPHEN 1000 MG: 500 TABLET ORAL at 20:27

## 2025-08-11 RX ADMIN — DOCUSATE SODIUM 100 MG: 100 CAPSULE, LIQUID FILLED ORAL at 20:27

## 2025-08-11 RX ADMIN — PENICILLIN G POTASSIUM 5 MILLION UNITS: 5000000 INJECTION, POWDER, FOR SOLUTION INTRAMUSCULAR; INTRAVENOUS at 23:43

## 2025-08-11 RX ADMIN — SODIUM CHLORIDE, PRESERVATIVE FREE 10 ML: 5 INJECTION INTRAVENOUS at 23:40

## 2025-08-11 ASSESSMENT — LIFESTYLE VARIABLES
HOW OFTEN DO YOU HAVE A DRINK CONTAINING ALCOHOL: NEVER
HOW MANY STANDARD DRINKS CONTAINING ALCOHOL DO YOU HAVE ON A TYPICAL DAY: PATIENT DOES NOT DRINK

## 2025-08-11 ASSESSMENT — PAIN - FUNCTIONAL ASSESSMENT
PAIN_FUNCTIONAL_ASSESSMENT: ACTIVITIES ARE NOT PREVENTED
PAIN_FUNCTIONAL_ASSESSMENT: 0-10

## 2025-08-11 ASSESSMENT — PAIN DESCRIPTION - LOCATION: LOCATION: HEAD

## 2025-08-11 ASSESSMENT — PAIN DESCRIPTION - DESCRIPTORS: DESCRIPTORS: ACHING

## 2025-08-11 ASSESSMENT — PAIN SCALES - GENERAL: PAINLEVEL_OUTOF10: 2

## 2025-08-12 ENCOUNTER — ANESTHESIA EVENT (OUTPATIENT)
Facility: HOSPITAL | Age: 30
End: 2025-08-12
Payer: MEDICAID

## 2025-08-12 ENCOUNTER — ANESTHESIA (OUTPATIENT)
Facility: HOSPITAL | Age: 30
End: 2025-08-12
Payer: MEDICAID

## 2025-08-12 PROBLEM — Z3A.39 PREGNANCY WITH 39 COMPLETED WEEKS GESTATION: Status: ACTIVE | Noted: 2025-08-12

## 2025-08-12 LAB
HBV SURFACE AG SER QL: NONREACTIVE
HCV AB SER QL: NONREACTIVE
HIV 1+2 AB+HIV1 P24 AG SERPL QL IA: NONREACTIVE
HIV 1/2 RESULT COMMENT: NORMAL
RPR SER QL: NONREACTIVE

## 2025-08-12 PROCEDURE — 6370000000 HC RX 637 (ALT 250 FOR IP): Performed by: OBSTETRICS & GYNECOLOGY

## 2025-08-12 PROCEDURE — 87086 URINE CULTURE/COLONY COUNT: CPT

## 2025-08-12 PROCEDURE — 7210000100 HC LABOR FEE PER 1 HR

## 2025-08-12 PROCEDURE — 6360000002 HC RX W HCPCS: Performed by: OBSTETRICS & GYNECOLOGY

## 2025-08-12 PROCEDURE — 2709999900 HC NON-CHARGEABLE SUPPLY: Performed by: OBSTETRICS & GYNECOLOGY

## 2025-08-12 PROCEDURE — 7100000001 HC PACU RECOVERY - ADDTL 15 MIN: Performed by: OBSTETRICS & GYNECOLOGY

## 2025-08-12 PROCEDURE — 3609079900 HC CESAREAN SECTION: Performed by: OBSTETRICS & GYNECOLOGY

## 2025-08-12 PROCEDURE — 2580000003 HC RX 258: Performed by: NURSE ANESTHETIST, CERTIFIED REGISTERED

## 2025-08-12 PROCEDURE — 6360000002 HC RX W HCPCS: Performed by: NURSE ANESTHETIST, CERTIFIED REGISTERED

## 2025-08-12 PROCEDURE — 3700000001 HC ADD 15 MINUTES (ANESTHESIA): Performed by: OBSTETRICS & GYNECOLOGY

## 2025-08-12 PROCEDURE — 3700000025 EPIDURAL BLOCK: Performed by: ANESTHESIOLOGY

## 2025-08-12 PROCEDURE — 94761 N-INVAS EAR/PLS OXIMETRY MLT: CPT

## 2025-08-12 PROCEDURE — 86900 BLOOD TYPING SEROLOGIC ABO: CPT

## 2025-08-12 PROCEDURE — 3700000156 HC EPIDURAL ANESTHESIA

## 2025-08-12 PROCEDURE — 7100000000 HC PACU RECOVERY - FIRST 15 MIN: Performed by: OBSTETRICS & GYNECOLOGY

## 2025-08-12 PROCEDURE — 86901 BLOOD TYPING SEROLOGIC RH(D): CPT

## 2025-08-12 PROCEDURE — 85461 HEMOGLOBIN FETAL: CPT

## 2025-08-12 PROCEDURE — 2720000010 HC SURG SUPPLY STERILE: Performed by: OBSTETRICS & GYNECOLOGY

## 2025-08-12 PROCEDURE — 1120000000 HC RM PRIVATE OB

## 2025-08-12 PROCEDURE — 3700000000 HC ANESTHESIA ATTENDED CARE: Performed by: OBSTETRICS & GYNECOLOGY

## 2025-08-12 RX ORDER — SODIUM CHLORIDE 0.9 % (FLUSH) 0.9 %
5-40 SYRINGE (ML) INJECTION PRN
Status: DISCONTINUED | OUTPATIENT
Start: 2025-08-12 | End: 2025-08-14 | Stop reason: HOSPADM

## 2025-08-12 RX ORDER — OXYCODONE HYDROCHLORIDE 5 MG/1
5 TABLET ORAL EVERY 4 HOURS PRN
Status: DISCONTINUED | OUTPATIENT
Start: 2025-08-12 | End: 2025-08-14 | Stop reason: HOSPADM

## 2025-08-12 RX ORDER — METOCLOPRAMIDE HYDROCHLORIDE 5 MG/ML
INJECTION INTRAMUSCULAR; INTRAVENOUS
Status: DISCONTINUED | OUTPATIENT
Start: 2025-08-12 | End: 2025-08-12 | Stop reason: SDUPTHER

## 2025-08-12 RX ORDER — ACETAMINOPHEN 500 MG
1000 TABLET ORAL EVERY 8 HOURS
Status: DISCONTINUED | OUTPATIENT
Start: 2025-08-12 | End: 2025-08-14 | Stop reason: HOSPADM

## 2025-08-12 RX ORDER — KETOROLAC TROMETHAMINE 30 MG/ML
INJECTION, SOLUTION INTRAMUSCULAR; INTRAVENOUS
Status: DISCONTINUED | OUTPATIENT
Start: 2025-08-12 | End: 2025-08-12 | Stop reason: SDUPTHER

## 2025-08-12 RX ORDER — DIPHENHYDRAMINE HYDROCHLORIDE 50 MG/ML
25 INJECTION, SOLUTION INTRAMUSCULAR; INTRAVENOUS EVERY 6 HOURS PRN
Status: DISCONTINUED | OUTPATIENT
Start: 2025-08-12 | End: 2025-08-14 | Stop reason: HOSPADM

## 2025-08-12 RX ORDER — ONDANSETRON 2 MG/ML
4 INJECTION INTRAMUSCULAR; INTRAVENOUS EVERY 6 HOURS PRN
Status: DISCONTINUED | OUTPATIENT
Start: 2025-08-12 | End: 2025-08-14 | Stop reason: HOSPADM

## 2025-08-12 RX ORDER — LIDOCAINE HYDROCHLORIDE 20 MG/ML
INJECTION, SOLUTION EPIDURAL; INFILTRATION; INTRACAUDAL; PERINEURAL
Status: DISCONTINUED | OUTPATIENT
Start: 2025-08-12 | End: 2025-08-12 | Stop reason: SDUPTHER

## 2025-08-12 RX ORDER — DOCUSATE SODIUM 100 MG/1
100 CAPSULE, LIQUID FILLED ORAL 2 TIMES DAILY
Status: DISCONTINUED | OUTPATIENT
Start: 2025-08-12 | End: 2025-08-14 | Stop reason: HOSPADM

## 2025-08-12 RX ORDER — PHENYLEPHRINE HCL IN 0.9% NACL 1 MG/10 ML
SYRINGE (ML) INTRAVENOUS
Status: DISCONTINUED | OUTPATIENT
Start: 2025-08-12 | End: 2025-08-12 | Stop reason: SDUPTHER

## 2025-08-12 RX ORDER — PROPOFOL 10 MG/ML
INJECTION, EMULSION INTRAVENOUS
Status: DISCONTINUED | OUTPATIENT
Start: 2025-08-12 | End: 2025-08-12 | Stop reason: SDUPTHER

## 2025-08-12 RX ORDER — ACETAMINOPHEN 500 MG
1000 TABLET ORAL EVERY 8 HOURS
Status: DISCONTINUED | OUTPATIENT
Start: 2025-08-12 | End: 2025-08-12

## 2025-08-12 RX ORDER — IBUPROFEN 800 MG/1
800 TABLET, FILM COATED ORAL EVERY 8 HOURS
Status: DISCONTINUED | OUTPATIENT
Start: 2025-08-13 | End: 2025-08-14 | Stop reason: HOSPADM

## 2025-08-12 RX ORDER — MIDAZOLAM HYDROCHLORIDE 1 MG/ML
INJECTION, SOLUTION INTRAMUSCULAR; INTRAVENOUS
Status: DISCONTINUED | OUTPATIENT
Start: 2025-08-12 | End: 2025-08-12 | Stop reason: SDUPTHER

## 2025-08-12 RX ORDER — MODIFIED LANOLIN
OINTMENT (GRAM) TOPICAL
Status: DISCONTINUED | OUTPATIENT
Start: 2025-08-12 | End: 2025-08-14 | Stop reason: HOSPADM

## 2025-08-12 RX ORDER — OXYTOCIN/RINGER'S LACTATE 30/500 ML
PLASTIC BAG, INJECTION (ML) INTRAVENOUS
Status: DISCONTINUED | OUTPATIENT
Start: 2025-08-12 | End: 2025-08-12 | Stop reason: SDUPTHER

## 2025-08-12 RX ORDER — SODIUM CHLORIDE, SODIUM LACTATE, POTASSIUM CHLORIDE, CALCIUM CHLORIDE 600; 310; 30; 20 MG/100ML; MG/100ML; MG/100ML; MG/100ML
INJECTION, SOLUTION INTRAVENOUS
Status: DISCONTINUED | OUTPATIENT
Start: 2025-08-12 | End: 2025-08-12 | Stop reason: SDUPTHER

## 2025-08-12 RX ORDER — ONDANSETRON 4 MG/1
4 TABLET, ORALLY DISINTEGRATING ORAL EVERY 8 HOURS PRN
Status: DISCONTINUED | OUTPATIENT
Start: 2025-08-12 | End: 2025-08-14 | Stop reason: HOSPADM

## 2025-08-12 RX ORDER — KETOROLAC TROMETHAMINE 30 MG/ML
30 INJECTION, SOLUTION INTRAMUSCULAR; INTRAVENOUS EVERY 6 HOURS
Status: DISPENSED | OUTPATIENT
Start: 2025-08-12 | End: 2025-08-13

## 2025-08-12 RX ORDER — ACETAMINOPHEN 325 MG/1
975 TABLET ORAL ONCE
Status: DISCONTINUED | OUTPATIENT
Start: 2025-08-12 | End: 2025-08-12

## 2025-08-12 RX ORDER — LIDOCAINE HYDROCHLORIDE AND EPINEPHRINE 20; 5 MG/ML; UG/ML
INJECTION, SOLUTION EPIDURAL; INFILTRATION; INTRACAUDAL; PERINEURAL
Status: DISCONTINUED | OUTPATIENT
Start: 2025-08-12 | End: 2025-08-12 | Stop reason: SDUPTHER

## 2025-08-12 RX ORDER — SODIUM CHLORIDE 0.9 % (FLUSH) 0.9 %
10 SYRINGE (ML) INJECTION PRN
Status: DISCONTINUED | OUTPATIENT
Start: 2025-08-12 | End: 2025-08-14 | Stop reason: HOSPADM

## 2025-08-12 RX ORDER — OXYCODONE HYDROCHLORIDE 10 MG/1
10 TABLET ORAL EVERY 4 HOURS PRN
Status: DISCONTINUED | OUTPATIENT
Start: 2025-08-12 | End: 2025-08-14 | Stop reason: HOSPADM

## 2025-08-12 RX ORDER — BUPIVACAINE HYDROCHLORIDE 2.5 MG/ML
INJECTION, SOLUTION EPIDURAL; INFILTRATION; INTRACAUDAL; PERINEURAL
Status: DISCONTINUED | OUTPATIENT
Start: 2025-08-12 | End: 2025-08-12 | Stop reason: SDUPTHER

## 2025-08-12 RX ORDER — SODIUM CHLORIDE 9 MG/ML
INJECTION, SOLUTION INTRAVENOUS PRN
Status: DISCONTINUED | OUTPATIENT
Start: 2025-08-12 | End: 2025-08-14 | Stop reason: HOSPADM

## 2025-08-12 RX ORDER — FENTANYL/BUPIVACAINE/NS/PF 2-1250MCG
10 PLASTIC BAG, INJECTION (ML) INJECTION CONTINUOUS
Refills: 0 | Status: DISCONTINUED | OUTPATIENT
Start: 2025-08-12 | End: 2025-08-12

## 2025-08-12 RX ORDER — OXYCODONE HYDROCHLORIDE 5 MG/1
5 TABLET ORAL EVERY 4 HOURS PRN
Qty: 20 TABLET | Refills: 0 | Status: SHIPPED | OUTPATIENT
Start: 2025-08-12 | End: 2025-08-19

## 2025-08-12 RX ORDER — BUPIVACAINE HYDROCHLORIDE 2.5 MG/ML
INJECTION, SOLUTION EPIDURAL; INFILTRATION; INTRACAUDAL; PERINEURAL
Status: DISCONTINUED
Start: 2025-08-12 | End: 2025-08-12

## 2025-08-12 RX ORDER — LOPERAMIDE HYDROCHLORIDE 2 MG/1
2 CAPSULE ORAL PRN
Status: DISCONTINUED | OUTPATIENT
Start: 2025-08-12 | End: 2025-08-14 | Stop reason: HOSPADM

## 2025-08-12 RX ORDER — CEFAZOLIN SODIUM 1 G/3ML
INJECTION, POWDER, FOR SOLUTION INTRAMUSCULAR; INTRAVENOUS
Status: DISCONTINUED | OUTPATIENT
Start: 2025-08-12 | End: 2025-08-12 | Stop reason: SDUPTHER

## 2025-08-12 RX ORDER — LIDOCAINE HYDROCHLORIDE 20 MG/ML
INJECTION, SOLUTION EPIDURAL; INFILTRATION; INTRACAUDAL; PERINEURAL
Status: DISCONTINUED
Start: 2025-08-12 | End: 2025-08-12

## 2025-08-12 RX ORDER — MORPHINE SULFATE 0.5 MG/ML
INJECTION, SOLUTION EPIDURAL; INTRATHECAL; INTRAVENOUS
Status: DISCONTINUED | OUTPATIENT
Start: 2025-08-12 | End: 2025-08-12 | Stop reason: SDUPTHER

## 2025-08-12 RX ORDER — SODIUM CHLORIDE 0.9 % (FLUSH) 0.9 %
5-40 SYRINGE (ML) INJECTION EVERY 12 HOURS SCHEDULED
Status: DISCONTINUED | OUTPATIENT
Start: 2025-08-12 | End: 2025-08-12

## 2025-08-12 RX ORDER — CALCIUM CARBONATE 500 MG/1
1000 TABLET, CHEWABLE ORAL 3 TIMES DAILY PRN
Status: DISCONTINUED | OUTPATIENT
Start: 2025-08-12 | End: 2025-08-12

## 2025-08-12 RX ORDER — SODIUM CHLORIDE, SODIUM LACTATE, POTASSIUM CHLORIDE, AND CALCIUM CHLORIDE .6; .31; .03; .02 G/100ML; G/100ML; G/100ML; G/100ML
1000 INJECTION, SOLUTION INTRAVENOUS ONCE
Status: DISCONTINUED | OUTPATIENT
Start: 2025-08-12 | End: 2025-08-12

## 2025-08-12 RX ORDER — IBUPROFEN 800 MG/1
800 TABLET, FILM COATED ORAL EVERY 8 HOURS
Qty: 30 TABLET | Refills: 1 | Status: SHIPPED | OUTPATIENT
Start: 2025-08-13

## 2025-08-12 RX ORDER — ONDANSETRON 2 MG/ML
INJECTION INTRAMUSCULAR; INTRAVENOUS
Status: DISCONTINUED | OUTPATIENT
Start: 2025-08-12 | End: 2025-08-12 | Stop reason: SDUPTHER

## 2025-08-12 RX ORDER — SODIUM CHLORIDE, SODIUM LACTATE, POTASSIUM CHLORIDE, CALCIUM CHLORIDE 600; 310; 30; 20 MG/100ML; MG/100ML; MG/100ML; MG/100ML
INJECTION, SOLUTION INTRAVENOUS CONTINUOUS
Status: DISCONTINUED | OUTPATIENT
Start: 2025-08-12 | End: 2025-08-12

## 2025-08-12 RX ADMIN — LIDOCAINE HYDROCHLORIDE,EPINEPHRINE BITARTRATE 3 ML: 20; .005 INJECTION, SOLUTION EPIDURAL; INFILTRATION; INTRACAUDAL; PERINEURAL at 07:52

## 2025-08-12 RX ADMIN — PROPOFOL 50 MG: 10 INJECTION, EMULSION INTRAVENOUS at 08:51

## 2025-08-12 RX ADMIN — PENICILLIN G 3 MILLION UNITS: 3000000 INJECTION, SOLUTION INTRAVENOUS at 07:46

## 2025-08-12 RX ADMIN — CALCIUM CARBONATE 1000 MG: 500 TABLET, CHEWABLE ORAL at 03:36

## 2025-08-12 RX ADMIN — MIDAZOLAM 2 MG: 1 INJECTION INTRAMUSCULAR; INTRAVENOUS at 08:51

## 2025-08-12 RX ADMIN — MORPHINE SULFATE 1 MG: 0.5 INJECTION, SOLUTION EPIDURAL; INTRATHECAL; INTRAVENOUS at 08:59

## 2025-08-12 RX ADMIN — PROPOFOL 50 MG: 10 INJECTION, EMULSION INTRAVENOUS at 08:59

## 2025-08-12 RX ADMIN — ONDANSETRON 4 MG: 2 INJECTION, SOLUTION INTRAMUSCULAR; INTRAVENOUS at 19:05

## 2025-08-12 RX ADMIN — KETOROLAC TROMETHAMINE 30 MG: 30 INJECTION, SOLUTION INTRAMUSCULAR at 15:24

## 2025-08-12 RX ADMIN — CEFAZOLIN 2 G: 1 INJECTION, POWDER, FOR SOLUTION INTRAMUSCULAR; INTRAVENOUS at 08:37

## 2025-08-12 RX ADMIN — PENICILLIN G 3 MILLION UNITS: 3000000 INJECTION, SOLUTION INTRAVENOUS at 03:40

## 2025-08-12 RX ADMIN — ZOLPIDEM TARTRATE 5 MG: 5 TABLET ORAL at 01:09

## 2025-08-12 RX ADMIN — LIDOCAINE HYDROCHLORIDE 2 ML: 20 INJECTION, SOLUTION EPIDURAL; INFILTRATION; INTRACAUDAL; PERINEURAL at 07:54

## 2025-08-12 RX ADMIN — FENTANYL CITRATE 50 MCG: 50 INJECTION, SOLUTION INTRAMUSCULAR; INTRAVENOUS at 06:57

## 2025-08-12 RX ADMIN — KETOROLAC TROMETHAMINE 30 MG: 30 INJECTION, SOLUTION INTRAMUSCULAR at 09:29

## 2025-08-12 RX ADMIN — LIDOCAINE HYDROCHLORIDE 5 ML: 20 INJECTION, SOLUTION EPIDURAL; INFILTRATION; INTRACAUDAL; PERINEURAL at 08:23

## 2025-08-12 RX ADMIN — LIDOCAINE HYDROCHLORIDE 5 ML: 20 INJECTION, SOLUTION EPIDURAL; INFILTRATION; INTRACAUDAL; PERINEURAL at 08:28

## 2025-08-12 RX ADMIN — PROPOFOL 50 MG: 10 INJECTION, EMULSION INTRAVENOUS at 08:56

## 2025-08-12 RX ADMIN — LIDOCAINE HYDROCHLORIDE 5 ML: 20 INJECTION, SOLUTION EPIDURAL; INFILTRATION; INTRACAUDAL; PERINEURAL at 08:17

## 2025-08-12 RX ADMIN — KETOROLAC TROMETHAMINE 30 MG: 30 INJECTION, SOLUTION INTRAMUSCULAR at 20:51

## 2025-08-12 RX ADMIN — LIDOCAINE HYDROCHLORIDE 5 ML: 20 INJECTION, SOLUTION EPIDURAL; INFILTRATION; INTRACAUDAL; PERINEURAL at 08:11

## 2025-08-12 RX ADMIN — ACETAMINOPHEN 1000 MG: 500 TABLET ORAL at 20:51

## 2025-08-12 RX ADMIN — Medication 25 MCG: at 05:00

## 2025-08-12 RX ADMIN — DOCUSATE SODIUM 100 MG: 100 CAPSULE, LIQUID FILLED ORAL at 10:10

## 2025-08-12 RX ADMIN — DOCUSATE SODIUM 100 MG: 100 CAPSULE, LIQUID FILLED ORAL at 20:51

## 2025-08-12 RX ADMIN — Medication 100 MCG: at 09:06

## 2025-08-12 RX ADMIN — HUMAN RHO(D) IMMUNE GLOBULIN 300 MCG: 1500 SOLUTION INTRAMUSCULAR; INTRAVENOUS at 20:26

## 2025-08-12 RX ADMIN — BUPIVACAINE HYDROCHLORIDE 5 ML: 2.5 INJECTION, SOLUTION EPIDURAL; INFILTRATION; INTRACAUDAL; PERINEURAL at 07:54

## 2025-08-12 RX ADMIN — ONDANSETRON 8 MG: 2 INJECTION INTRAMUSCULAR; INTRAVENOUS at 08:33

## 2025-08-12 RX ADMIN — OXYCODONE HYDROCHLORIDE 10 MG: 10 TABLET ORAL at 10:07

## 2025-08-12 RX ADMIN — DIPHENHYDRAMINE HYDROCHLORIDE 25 MG: 50 INJECTION INTRAMUSCULAR; INTRAVENOUS at 22:51

## 2025-08-12 RX ADMIN — Medication 500 MILLI-UNITS/MIN: at 08:51

## 2025-08-12 RX ADMIN — MORPHINE SULFATE 1 MG: 0.5 INJECTION, SOLUTION EPIDURAL; INTRATHECAL; INTRAVENOUS at 08:51

## 2025-08-12 RX ADMIN — Medication 87.3 MILLI-UNITS/MIN: at 10:32

## 2025-08-12 RX ADMIN — METOCLOPRAMIDE 5 MG: 5 INJECTION, SOLUTION INTRAMUSCULAR; INTRAVENOUS at 08:41

## 2025-08-12 RX ADMIN — Medication 25 MCG: at 01:00

## 2025-08-12 RX ADMIN — PROPOFOL 50 MG: 10 INJECTION, EMULSION INTRAVENOUS at 09:04

## 2025-08-12 RX ADMIN — MORPHINE SULFATE 1 MG: 0.5 INJECTION, SOLUTION EPIDURAL; INTRATHECAL; INTRAVENOUS at 08:56

## 2025-08-12 RX ADMIN — PROPOFOL 50 MG: 10 INJECTION, EMULSION INTRAVENOUS at 09:09

## 2025-08-12 RX ADMIN — SODIUM CHLORIDE, POTASSIUM CHLORIDE, SODIUM LACTATE AND CALCIUM CHLORIDE: 600; 310; 30; 20 INJECTION, SOLUTION INTRAVENOUS at 08:27

## 2025-08-12 ASSESSMENT — PAIN SCALES - GENERAL
PAINLEVEL_OUTOF10: 0
PAINLEVEL_OUTOF10: 6
PAINLEVEL_OUTOF10: 0
PAINLEVEL_OUTOF10: 6

## 2025-08-12 ASSESSMENT — PAIN DESCRIPTION - DESCRIPTORS
DESCRIPTORS: TIGHTNESS;CRAMPING
DESCRIPTORS: SORE

## 2025-08-12 ASSESSMENT — PAIN - FUNCTIONAL ASSESSMENT
PAIN_FUNCTIONAL_ASSESSMENT: PREVENTS OR INTERFERES SOME ACTIVE ACTIVITIES AND ADLS
PAIN_FUNCTIONAL_ASSESSMENT: 0-10
PAIN_FUNCTIONAL_ASSESSMENT: 0-10
PAIN_FUNCTIONAL_ASSESSMENT: ACTIVITIES ARE NOT PREVENTED
PAIN_FUNCTIONAL_ASSESSMENT: 0-10

## 2025-08-12 ASSESSMENT — PAIN DESCRIPTION - LOCATION
LOCATION: ABDOMEN
LOCATION: INCISION

## 2025-08-12 ASSESSMENT — PAIN DESCRIPTION - ORIENTATION: ORIENTATION: LOWER

## 2025-08-13 LAB
ABO + RH BLD: NORMAL
BACTERIA SPEC CULT: NORMAL
BLD PROD TYP BPU: NORMAL
BLD PROD TYP BPU: NORMAL
BLOOD BANK BLOOD PRODUCT EXPIRATION DATE: NORMAL
BLOOD BANK DISPENSE STATUS: NORMAL
BPU ID: NORMAL
ERYTHROCYTE [DISTWIDTH] IN BLOOD BY AUTOMATED COUNT: 13.9 % (ref 11.5–14.5)
FETAL SCREEN: NEGATIVE
HCT VFR BLD AUTO: 24.5 % (ref 35–47)
HGB BLD-MCNC: 7.8 G/DL (ref 11.5–16)
Lab: NORMAL
MCH RBC QN AUTO: 27.9 PG (ref 26–34)
MCHC RBC AUTO-ENTMCNC: 31.8 G/DL (ref 30–36.5)
MCV RBC AUTO: 87.5 FL (ref 80–99)
NRBC # BLD: 0 K/UL (ref 0–0.01)
NRBC BLD-RTO: 0 PER 100 WBC
NUM BPU REQUESTED: 1
PLATELET # BLD AUTO: 238 K/UL (ref 150–400)
PMV BLD AUTO: 10.1 FL (ref 8.9–12.9)
RBC # BLD AUTO: 2.8 M/UL (ref 3.8–5.2)
TRANSFUSION STATUS PATIENT QL: NORMAL
UNIT DIVISION: 0
UNIT ISSUE DATE/TIME: NORMAL
WBC # BLD AUTO: 10.5 K/UL (ref 3.6–11)

## 2025-08-13 PROCEDURE — 1120000000 HC RM PRIVATE OB

## 2025-08-13 PROCEDURE — 6360000002 HC RX W HCPCS: Performed by: OBSTETRICS & GYNECOLOGY

## 2025-08-13 PROCEDURE — 36415 COLL VENOUS BLD VENIPUNCTURE: CPT

## 2025-08-13 PROCEDURE — 6370000000 HC RX 637 (ALT 250 FOR IP): Performed by: OBSTETRICS & GYNECOLOGY

## 2025-08-13 PROCEDURE — 85027 COMPLETE CBC AUTOMATED: CPT

## 2025-08-13 RX ADMIN — IBUPROFEN 800 MG: 800 TABLET, FILM COATED ORAL at 09:23

## 2025-08-13 RX ADMIN — ONDANSETRON 4 MG: 2 INJECTION, SOLUTION INTRAMUSCULAR; INTRAVENOUS at 00:08

## 2025-08-13 RX ADMIN — ACETAMINOPHEN 1000 MG: 500 TABLET ORAL at 04:25

## 2025-08-13 RX ADMIN — ACETAMINOPHEN 1000 MG: 500 TABLET ORAL at 20:12

## 2025-08-13 RX ADMIN — Medication: at 00:07

## 2025-08-13 RX ADMIN — ACETAMINOPHEN 1000 MG: 500 TABLET ORAL at 12:11

## 2025-08-13 RX ADMIN — KETOROLAC TROMETHAMINE 30 MG: 30 INJECTION, SOLUTION INTRAMUSCULAR at 04:25

## 2025-08-13 RX ADMIN — DOCUSATE SODIUM 100 MG: 100 CAPSULE, LIQUID FILLED ORAL at 20:13

## 2025-08-13 RX ADMIN — IBUPROFEN 800 MG: 800 TABLET, FILM COATED ORAL at 17:59

## 2025-08-13 RX ADMIN — DOCUSATE SODIUM 100 MG: 100 CAPSULE, LIQUID FILLED ORAL at 09:23

## 2025-08-13 ASSESSMENT — PAIN - FUNCTIONAL ASSESSMENT
PAIN_FUNCTIONAL_ASSESSMENT: 0-10

## 2025-08-13 ASSESSMENT — PAIN SCALES - GENERAL
PAINLEVEL_OUTOF10: 0
PAINLEVEL_OUTOF10: 0

## 2025-08-14 VITALS
OXYGEN SATURATION: 98 % | WEIGHT: 223 LBS | HEIGHT: 67 IN | RESPIRATION RATE: 18 BRPM | SYSTOLIC BLOOD PRESSURE: 117 MMHG | BODY MASS INDEX: 35 KG/M2 | TEMPERATURE: 98.4 F | DIASTOLIC BLOOD PRESSURE: 68 MMHG | HEART RATE: 89 BPM

## 2025-08-14 PROBLEM — Z34.90 PREGNANCY: Status: RESOLVED | Noted: 2017-08-01 | Resolved: 2025-08-14

## 2025-08-14 PROBLEM — Z3A.39 PREGNANCY WITH 39 COMPLETED WEEKS GESTATION: Status: RESOLVED | Noted: 2025-08-12 | Resolved: 2025-08-14

## 2025-08-14 PROBLEM — Z34.90 PREGNANT: Status: RESOLVED | Noted: 2023-01-30 | Resolved: 2025-08-14

## 2025-08-14 PROBLEM — Z3A.39 39 WEEKS GESTATION OF PREGNANCY: Status: RESOLVED | Noted: 2025-08-11 | Resolved: 2025-08-14

## 2025-08-14 PROCEDURE — 6370000000 HC RX 637 (ALT 250 FOR IP): Performed by: OBSTETRICS & GYNECOLOGY

## 2025-08-14 PROCEDURE — 99024 POSTOP FOLLOW-UP VISIT: CPT | Performed by: OBSTETRICS & GYNECOLOGY

## 2025-08-14 RX ORDER — PSEUDOEPHEDRINE HCL 30 MG
100 TABLET ORAL 2 TIMES DAILY PRN
Qty: 30 CAPSULE | Refills: 0 | Status: SHIPPED | OUTPATIENT
Start: 2025-08-14

## 2025-08-14 RX ADMIN — ACETAMINOPHEN 1000 MG: 500 TABLET ORAL at 13:23

## 2025-08-14 RX ADMIN — ACETAMINOPHEN 1000 MG: 500 TABLET ORAL at 05:21

## 2025-08-14 RX ADMIN — OXYCODONE 5 MG: 5 TABLET ORAL at 05:21

## 2025-08-14 RX ADMIN — IBUPROFEN 800 MG: 800 TABLET, FILM COATED ORAL at 09:31

## 2025-08-14 RX ADMIN — DOCUSATE SODIUM 100 MG: 100 CAPSULE, LIQUID FILLED ORAL at 09:31

## 2025-08-14 ASSESSMENT — PAIN DESCRIPTION - LOCATION: LOCATION: ABDOMEN;INCISION

## 2025-08-14 ASSESSMENT — PAIN DESCRIPTION - ORIENTATION: ORIENTATION: LOWER

## 2025-08-14 ASSESSMENT — PAIN DESCRIPTION - DESCRIPTORS: DESCRIPTORS: BURNING

## 2025-08-14 ASSESSMENT — PAIN SCALES - GENERAL: PAINLEVEL_OUTOF10: 6

## 2025-08-14 ASSESSMENT — PAIN - FUNCTIONAL ASSESSMENT
PAIN_FUNCTIONAL_ASSESSMENT: 0-10
PAIN_FUNCTIONAL_ASSESSMENT: PREVENTS OR INTERFERES SOME ACTIVE ACTIVITIES AND ADLS

## 2025-08-17 LAB — RUBV IGG SERPL IA-ACNC: 15.1 IU/ML

## 2025-08-20 ENCOUNTER — POSTPARTUM VISIT (OUTPATIENT)
Age: 30
End: 2025-08-20

## 2025-08-20 VITALS
SYSTOLIC BLOOD PRESSURE: 124 MMHG | BODY MASS INDEX: 32.69 KG/M2 | DIASTOLIC BLOOD PRESSURE: 70 MMHG | HEIGHT: 67 IN | WEIGHT: 208.25 LBS

## 2025-08-20 DIAGNOSIS — Z09 POSTOP CHECK: Primary | ICD-10-CM

## 2025-08-20 PROCEDURE — 99024 POSTOP FOLLOW-UP VISIT: CPT | Performed by: OBSTETRICS & GYNECOLOGY

## 2025-09-02 ENCOUNTER — POSTPARTUM VISIT (OUTPATIENT)
Age: 30
End: 2025-09-02

## 2025-09-02 DIAGNOSIS — Z09 POSTOP CHECK: Primary | ICD-10-CM

## 2025-09-02 PROCEDURE — 99024 POSTOP FOLLOW-UP VISIT: CPT | Performed by: OBSTETRICS & GYNECOLOGY

## (undated) DEVICE — GLOVE SURG 7.5 PF CRM STRL PROTEXIS PI W/ NEU-THERA LF

## (undated) DEVICE — ADHESIVE SKIN CLSR 0.7ML TOP DERMBND ADV

## (undated) DEVICE — SCALPEL SURG NO10 W/ S STL BLDE PROTECTED LOK RETRCT SHLD

## (undated) DEVICE — TRAY URIN CATH PED 16FR BLLN 5CC INDWL STR TIP INF CTRL

## (undated) DEVICE — ELECTRODE PT RET AD L9FT HI MOIST COND ADH HYDRGEL CORDED

## (undated) DEVICE — APPLICATOR MEDICATED 26 CC SOLUTION SCRB TEAL CHLORAPREP

## (undated) DEVICE — GARMENT COMPR STD FOR 17IN CALF UNIF THER FLOTRN

## (undated) DEVICE — SUTURE ABSORBABLE BRAIDED 0 CT 36 IN DA UD VICRYL VCP958H

## (undated) DEVICE — Device

## (undated) DEVICE — STAPLER SKIN SQ 30 ABSRB STPL DISP INSORB ORDER VIA PHONE OR EMAIL

## (undated) DEVICE — BAG SPNG COUNT CLR FOR IV POLE

## (undated) DEVICE — SUTURE VICRYL ABSRB BRAID COAT UD CT NO 1 36IN  J959H

## (undated) DEVICE — SOLUTION IRRIG 1000ML 0.9% SOD CHL USP POUR PLAS BTL

## (undated) DEVICE — SUTURE VICRYL + SZ 2-0 L36IN ABSRB UD L36MM CT-1 1/2 CIR VCP945H

## (undated) DEVICE — COVER LIGHT HANDLE STRL STERIS

## (undated) DEVICE — AGENT HEMSTAT 3GM OXIDIZED REGENERATED CELOS ABSRB FOR CONT (ORDER MULTIPLES OF 5EA)